# Patient Record
Sex: FEMALE | Race: WHITE | Employment: FULL TIME | URBAN - METROPOLITAN AREA
[De-identification: names, ages, dates, MRNs, and addresses within clinical notes are randomized per-mention and may not be internally consistent; named-entity substitution may affect disease eponyms.]

---

## 2020-08-11 LAB
ANTIBODY SCREEN, EXTERNAL: NEGATIVE
CHLAMYDIA, EXTERNAL: NEGATIVE
HBSAG, EXTERNAL: NEGATIVE
HIV, EXTERNAL: NON REACTIVE
N. GONORRHEA, EXTERNAL: NEGATIVE
RPR, EXTERNAL: NON REACTIVE
RUBELLA, EXTERNAL: NORMAL
TYPE, ABO & RH, EXTERNAL: NORMAL

## 2021-02-15 LAB — GRBS, EXTERNAL: NEGATIVE

## 2021-03-05 ENCOUNTER — HOSPITAL ENCOUNTER (OUTPATIENT)
Dept: PREADMISSION TESTING | Age: 36
Discharge: HOME | End: 2021-03-05

## 2021-03-05 LAB — SARS-COV-2, COV2: NORMAL

## 2021-03-05 PROCEDURE — U0003 INFECTIOUS AGENT DETECTION BY NUCLEIC ACID (DNA OR RNA); SEVERE ACUTE RESPIRATORY SYNDROME CORONAVIRUS 2 (SARS-COV-2) (CORONAVIRUS DISEASE [COVID-19]), AMPLIFIED PROBE TECHNIQUE, MAKING USE OF HIGH THROUGHPUT TECHNOLOGIES AS DESCRIBED BY CMS-2020-01-R: HCPCS

## 2021-03-06 LAB — SARS-COV-2, COV2NT: NOT DETECTED

## 2021-03-08 ENCOUNTER — HOSPITAL ENCOUNTER (INPATIENT)
Age: 36
LOS: 3 days | Discharge: HOME | End: 2021-03-11
Attending: OBSTETRICS & GYNECOLOGY | Admitting: OBSTETRICS & GYNECOLOGY
Payer: COMMERCIAL

## 2021-03-08 ENCOUNTER — ANESTHESIA EVENT (OUTPATIENT)
Dept: LABOR AND DELIVERY | Age: 36
End: 2021-03-08
Payer: COMMERCIAL

## 2021-03-08 ENCOUNTER — ANESTHESIA (OUTPATIENT)
Dept: LABOR AND DELIVERY | Age: 36
End: 2021-03-08
Payer: COMMERCIAL

## 2021-03-08 DIAGNOSIS — Z98.891 S/P C-SECTION: Primary | ICD-10-CM

## 2021-03-08 PROBLEM — Z34.90 TERM PREGNANCY: Status: ACTIVE | Noted: 2021-03-08

## 2021-03-08 LAB
ABO + RH BLD: NORMAL
AMPHET UR QL SCN: NEGATIVE
BARBITURATES UR QL SCN: NEGATIVE
BASOPHILS # BLD: 0 K/UL (ref 0–0.1)
BASOPHILS NFR BLD: 0 % (ref 0–1)
BENZODIAZ UR QL: NEGATIVE
BLOOD GROUP ANTIBODIES SERPL: NORMAL
CANNABINOIDS UR QL SCN: NEGATIVE
COCAINE UR QL SCN: NEGATIVE
DIFFERENTIAL METHOD BLD: ABNORMAL
DRUG SCRN COMMENT,DRGCM: NORMAL
EOSINOPHIL # BLD: 0 K/UL (ref 0–0.4)
EOSINOPHIL NFR BLD: 0 % (ref 0–7)
ERYTHROCYTE [DISTWIDTH] IN BLOOD BY AUTOMATED COUNT: 13.2 % (ref 11.5–14.5)
HCT VFR BLD AUTO: 38.5 % (ref 35–47)
HGB BLD-MCNC: 13 G/DL (ref 11.5–16)
IMM GRANULOCYTES # BLD AUTO: 0.1 K/UL (ref 0–0.04)
IMM GRANULOCYTES NFR BLD AUTO: 1 % (ref 0–0.5)
LYMPHOCYTES # BLD: 1.4 K/UL (ref 0.8–3.5)
LYMPHOCYTES NFR BLD: 12 % (ref 12–49)
MCH RBC QN AUTO: 30.9 PG (ref 26–34)
MCHC RBC AUTO-ENTMCNC: 33.8 G/DL (ref 30–36.5)
MCV RBC AUTO: 91.4 FL (ref 80–99)
METHADONE UR QL: NEGATIVE
MONOCYTES # BLD: 0.5 K/UL (ref 0–1)
MONOCYTES NFR BLD: 4 % (ref 5–13)
NEUTS SEG # BLD: 9.7 K/UL (ref 1.8–8)
NEUTS SEG NFR BLD: 83 % (ref 32–75)
NRBC # BLD: 0 K/UL (ref 0–0.01)
NRBC BLD-RTO: 0 PER 100 WBC
OPIATES UR QL: NEGATIVE
PCP UR QL: NEGATIVE
PLATELET # BLD AUTO: 188 K/UL (ref 150–400)
PMV BLD AUTO: 11.7 FL (ref 8.9–12.9)
RBC # BLD AUTO: 4.21 M/UL (ref 3.8–5.2)
SPECIMEN EXP DATE BLD: NORMAL
WBC # BLD AUTO: 11.7 K/UL (ref 3.6–11)

## 2021-03-08 PROCEDURE — 85025 COMPLETE CBC W/AUTO DIFF WBC: CPT

## 2021-03-08 PROCEDURE — 74011000250 HC RX REV CODE- 250: Performed by: NURSE ANESTHETIST, CERTIFIED REGISTERED

## 2021-03-08 PROCEDURE — 77030014125 HC TY EPDRL BBMI -B: Performed by: ANESTHESIOLOGY

## 2021-03-08 PROCEDURE — 36415 COLL VENOUS BLD VENIPUNCTURE: CPT

## 2021-03-08 PROCEDURE — 86901 BLOOD TYPING SEROLOGIC RH(D): CPT

## 2021-03-08 PROCEDURE — 80307 DRUG TEST PRSMV CHEM ANLYZR: CPT

## 2021-03-08 PROCEDURE — 74011250636 HC RX REV CODE- 250/636: Performed by: OBSTETRICS & GYNECOLOGY

## 2021-03-08 PROCEDURE — 74011000250 HC RX REV CODE- 250: Performed by: ANESTHESIOLOGY

## 2021-03-08 PROCEDURE — 65410000002 HC RM PRIVATE OB

## 2021-03-08 PROCEDURE — 75410000002 HC LABOR FEE PER 1 HR

## 2021-03-08 PROCEDURE — 10907ZC DRAINAGE OF AMNIOTIC FLUID, THERAPEUTIC FROM PRODUCTS OF CONCEPTION, VIA NATURAL OR ARTIFICIAL OPENING: ICD-10-PCS | Performed by: OBSTETRICS & GYNECOLOGY

## 2021-03-08 PROCEDURE — 4A1HXCZ MONITORING OF PRODUCTS OF CONCEPTION, CARDIAC RATE, EXTERNAL APPROACH: ICD-10-PCS | Performed by: OBSTETRICS & GYNECOLOGY

## 2021-03-08 PROCEDURE — 10H07YZ INSERTION OF OTHER DEVICE INTO PRODUCTS OF CONCEPTION, VIA NATURAL OR ARTIFICIAL OPENING: ICD-10-PCS | Performed by: OBSTETRICS & GYNECOLOGY

## 2021-03-08 RX ORDER — FENTANYL/BUPIVACAINE/NS/PF 2-1250MCG
1-16 PREFILLED PUMP RESERVOIR EPIDURAL CONTINUOUS
Status: DISCONTINUED | OUTPATIENT
Start: 2021-03-08 | End: 2021-03-09 | Stop reason: HOSPADM

## 2021-03-08 RX ORDER — SODIUM CHLORIDE, SODIUM LACTATE, POTASSIUM CHLORIDE, CALCIUM CHLORIDE 600; 310; 30; 20 MG/100ML; MG/100ML; MG/100ML; MG/100ML
125 INJECTION, SOLUTION INTRAVENOUS CONTINUOUS
Status: DISCONTINUED | OUTPATIENT
Start: 2021-03-08 | End: 2021-03-10

## 2021-03-08 RX ORDER — OXYTOCIN/RINGER'S LACTATE 30/500 ML
10 PLASTIC BAG, INJECTION (ML) INTRAVENOUS AS NEEDED
Status: DISCONTINUED | OUTPATIENT
Start: 2021-03-08 | End: 2021-03-11 | Stop reason: HOSPADM

## 2021-03-08 RX ORDER — VENLAFAXINE HYDROCHLORIDE 150 MG/1
150 CAPSULE, EXTENDED RELEASE ORAL DAILY
Status: DISCONTINUED | OUTPATIENT
Start: 2021-03-09 | End: 2021-03-09

## 2021-03-08 RX ORDER — SODIUM CHLORIDE 0.9 % (FLUSH) 0.9 %
5-40 SYRINGE (ML) INJECTION EVERY 8 HOURS
Status: DISCONTINUED | OUTPATIENT
Start: 2021-03-08 | End: 2021-03-10

## 2021-03-08 RX ORDER — SODIUM CHLORIDE 0.9 % (FLUSH) 0.9 %
5-40 SYRINGE (ML) INJECTION EVERY 8 HOURS
Status: DISCONTINUED | OUTPATIENT
Start: 2021-03-08 | End: 2021-03-09 | Stop reason: HOSPADM

## 2021-03-08 RX ORDER — VALACYCLOVIR HYDROCHLORIDE 500 MG/1
500 TABLET, FILM COATED ORAL 2 TIMES DAILY
COMMUNITY
End: 2021-03-11

## 2021-03-08 RX ORDER — NALOXONE HYDROCHLORIDE 0.4 MG/ML
0.4 INJECTION, SOLUTION INTRAMUSCULAR; INTRAVENOUS; SUBCUTANEOUS AS NEEDED
Status: DISCONTINUED | OUTPATIENT
Start: 2021-03-08 | End: 2021-03-09 | Stop reason: HOSPADM

## 2021-03-08 RX ORDER — FENTANYL/BUPIVACAINE/NS/PF 2-1250MCG
12 PREFILLED PUMP RESERVOIR EPIDURAL CONTINUOUS
Status: DISCONTINUED | OUTPATIENT
Start: 2021-03-08 | End: 2021-03-09 | Stop reason: HOSPADM

## 2021-03-08 RX ORDER — SODIUM CHLORIDE 0.9 % (FLUSH) 0.9 %
5-40 SYRINGE (ML) INJECTION AS NEEDED
Status: DISCONTINUED | OUTPATIENT
Start: 2021-03-08 | End: 2021-03-09 | Stop reason: HOSPADM

## 2021-03-08 RX ORDER — EPHEDRINE SULFATE/0.9% NACL/PF 50 MG/5 ML
12.5 SYRINGE (ML) INTRAVENOUS
Status: ACTIVE | OUTPATIENT
Start: 2021-03-08 | End: 2021-03-09

## 2021-03-08 RX ORDER — OXYTOCIN/RINGER'S LACTATE 30/500 ML
1-25 PLASTIC BAG, INJECTION (ML) INTRAVENOUS
Status: DISCONTINUED | OUTPATIENT
Start: 2021-03-08 | End: 2021-03-10

## 2021-03-08 RX ORDER — BUPIVACAINE HYDROCHLORIDE AND EPINEPHRINE 2.5; 5 MG/ML; UG/ML
INJECTION, SOLUTION EPIDURAL; INFILTRATION; INTRACAUDAL; PERINEURAL
Status: DISCONTINUED | OUTPATIENT
Start: 2021-03-08 | End: 2021-03-08

## 2021-03-08 RX ORDER — SODIUM CHLORIDE 0.9 % (FLUSH) 0.9 %
5-40 SYRINGE (ML) INJECTION AS NEEDED
Status: DISCONTINUED | OUTPATIENT
Start: 2021-03-08 | End: 2021-03-11 | Stop reason: HOSPADM

## 2021-03-08 RX ORDER — BUPIVACAINE HYDROCHLORIDE AND EPINEPHRINE 2.5; 5 MG/ML; UG/ML
INJECTION, SOLUTION EPIDURAL; INFILTRATION; INTRACAUDAL; PERINEURAL AS NEEDED
Status: DISCONTINUED | OUTPATIENT
Start: 2021-03-08 | End: 2021-03-09 | Stop reason: HOSPADM

## 2021-03-08 RX ORDER — VENLAFAXINE HYDROCHLORIDE 150 MG/1
150 TABLET, EXTENDED RELEASE ORAL DAILY
COMMUNITY

## 2021-03-08 RX ORDER — EPHEDRINE SULFATE/0.9% NACL/PF 50 MG/5 ML
10 SYRINGE (ML) INTRAVENOUS
Status: COMPLETED | OUTPATIENT
Start: 2021-03-08 | End: 2021-03-08

## 2021-03-08 RX ORDER — OXYTOCIN/RINGER'S LACTATE 30/500 ML
87.3 PLASTIC BAG, INJECTION (ML) INTRAVENOUS AS NEEDED
Status: COMPLETED | OUTPATIENT
Start: 2021-03-08 | End: 2021-03-09

## 2021-03-08 RX ADMIN — Medication 12 ML/HR: at 15:33

## 2021-03-08 RX ADMIN — Medication 12 ML/HR: at 22:52

## 2021-03-08 RX ADMIN — Medication 10 MG: at 15:47

## 2021-03-08 RX ADMIN — OXYTOCIN 1 MILLI-UNITS/MIN: 10 INJECTION, SOLUTION INTRAMUSCULAR; INTRAVENOUS at 22:46

## 2021-03-08 RX ADMIN — BUPIVACAINE HYDROCHLORIDE AND EPINEPHRINE 3 ML: 2.5; 5 INJECTION, SOLUTION EPIDURAL; INFILTRATION; INTRACAUDAL; PERINEURAL at 15:20

## 2021-03-08 RX ADMIN — OXYTOCIN 4 MILLI-UNITS/MIN: 10 INJECTION, SOLUTION INTRAMUSCULAR; INTRAVENOUS at 23:50

## 2021-03-08 RX ADMIN — BUPIVACAINE HYDROCHLORIDE AND EPINEPHRINE 7 ML: 2.5; 5 INJECTION, SOLUTION EPIDURAL; INFILTRATION; INTRACAUDAL; PERINEURAL at 15:22

## 2021-03-08 RX ADMIN — SODIUM CHLORIDE, POTASSIUM CHLORIDE, SODIUM LACTATE AND CALCIUM CHLORIDE 125 ML/HR: 600; 310; 30; 20 INJECTION, SOLUTION INTRAVENOUS at 20:04

## 2021-03-08 RX ADMIN — SODIUM CHLORIDE, POTASSIUM CHLORIDE, SODIUM LACTATE AND CALCIUM CHLORIDE 999 ML/HR: 600; 310; 30; 20 INJECTION, SOLUTION INTRAVENOUS at 13:58

## 2021-03-08 RX ADMIN — SODIUM CHLORIDE, POTASSIUM CHLORIDE, SODIUM LACTATE AND CALCIUM CHLORIDE 999 ML/HR: 600; 310; 30; 20 INJECTION, SOLUTION INTRAVENOUS at 15:03

## 2021-03-08 NOTE — PROGRESS NOTES
Pt of Dr. Kalen Tolbert arrives to L&D sent from office with report of labor \"3cm in office. \" Pt deneis vaginal bleeding, discharge, LOF; reports +FM. Pt denies complications with pregnancy.

## 2021-03-08 NOTE — ROUTINE PROCESS
1510- Anesthesia at bedside to place epidural. Patient sitting up on side of bed. Patient off EFM at this time 1512- Time out performed 1520- Test dose in by anesthesia 1523- Patient back on EFM. 200- Dr. Salas King at bedside. SVE done. Cervix 6 cm 
 
1910- Bedside and Verbal shift change report given to April CAMARA and Joanie CAREY (oncoming nurse) by Danny Miller (offgoing nurse). Report included the following information SBAR, Kardex, MAR and Recent Results.

## 2021-03-08 NOTE — PROGRESS NOTES
03/08/21 1546   Cervical Exam   Dilation (cm) 4   Eff 80 %   Station -2   Baby Position Vertex   G1 38 6/7 wks in labor now comfortable with epidural. AROM performed- scant clear fluid  -CEFM  -gbs neg

## 2021-03-08 NOTE — ANESTHESIA PROCEDURE NOTES
Epidural Block    Start time: 3/8/2021 3:12 PM  End time: 3/8/2021 3:22 PM  Reason for block: labor epidural  Staffing  Performed: CRNA   Resident/CRNA: Yvette Kingsley CRNA  Preanesthetic Checklist  Completed: patient identified, IV checked, risks and benefits discussed, surgical consent, monitors and equipment checked, pre-op evaluation and timeout performed  Block Placement  Patient position: sitting  Prep: DuraPrep  Sterility prep: cap, drape, gloves and mask  Sedation level: no sedation  Patient monitoring: continuous pulse oximetry, frequent blood pressure checks and heart rate  Approach: midline  Location: lumbar  Epidural  Loss of resistance technique: saline  Guidance: landmark technique  Needle  Needle type: Tuohy   Needle gauge: 17 G  Needle length: 10 cm  Needle insertion depth: 6 cm  Catheter type: end hole  Catheter size: 19 G  Catheter at skin depth: 12 cm  Catheter securement method: clear occlusive dressing  Test dose: negative  Assessment  Sensory level: T10  Block outcome: pain improved  Number of attempts: 1  Procedure assessment: patient tolerated procedure well with no complications

## 2021-03-08 NOTE — H&P
History & Physical    Name: Chana Spear MRN: [id number]  SSN: [id number]    YOB: 1985  Age: 28 y.o. Sex: female        Subjective:     Estimated Date of Delivery: 3/16/21  OB History    Para Term  AB Living   1         0   SAB TAB Ectopic Molar Multiple Live Births                    # Outcome Date GA Lbr Marcus/2nd Weight Sex Delivery Anes PTL Lv   1 Current                Ms. Vickey Garcia is admitted with pregnancy at 38w6d for active labor. Prenatal course was normal. Please see prenatal records for details. She presented to office today c/o contractions since 10 am. No LOF/VB and reports active FM. Found to be 3/100/-2/vtx on cervical exam by Dr Genevieve Devine  H/o HSV on Valtrex with no evidence of active outbreak  H/o depression on Effexor      Past Medical History:   Diagnosis Date    Genital herpes     Psychiatric problem     depression     Past Surgical History:   Procedure Laterality Date    HX OTHER SURGICAL      eye surgery    HX WISDOM TEETH EXTRACTION       Social History     Occupational History    Not on file   Tobacco Use    Smoking status: Never Smoker    Smokeless tobacco: Never Used   Substance and Sexual Activity    Alcohol use: Not Currently    Drug use: Never    Sexual activity: Not on file     History reviewed. No pertinent family history. Allergies   Allergen Reactions    Penicillins Unknown (comments)     Pt reports childhood,unknown     Prior to Admission medications    Not on File        Review of Systems: A comprehensive review of systems was negative except for that written in the HPI. Objective:     Vitals:  Vitals:    21 1416   Weight: 78 kg (172 lb)   Height: 5' 4\" (1.626 m)        Physical Exam:  Patient without distress.   Heart: Regular rate and rhythm  Lung: clear to auscultation throughout lung fields, no wheezes, no rales, no rhonchi and normal respiratory effort  Abdomen: soft, nontender  Membranes:  Intact  Fetal Heart Rate: Reactive    Prenatal Labs:   No results found for: ABORH, RUBELLAEXT, GRBSEXT, HBSAGEXT, HIVEXT, RPREXT, GONNOEXT, CHLAMEXT, ABORHEXT, RUBELLAEXT, GRBSEXT, HBSAGEXT, HIVEXT, RPREXT, GONNOEXT, CHLAMEXT      Assessment/Plan:     Active Problems:    Term pregnancy (3/8/2021)         Plan: Admit for Labor  Progressing normally. Group B Strep was negative  -cbc, STBB  -desires epidural  -CEFM.

## 2021-03-09 PROCEDURE — 74011250637 HC RX REV CODE- 250/637: Performed by: OBSTETRICS & GYNECOLOGY

## 2021-03-09 PROCEDURE — 76060000078 HC EPIDURAL ANESTHESIA: Performed by: STUDENT IN AN ORGANIZED HEALTH CARE EDUCATION/TRAINING PROGRAM

## 2021-03-09 PROCEDURE — 2709999900 HC NON-CHARGEABLE SUPPLY

## 2021-03-09 PROCEDURE — 77030002974 HC SUT PLN J&J -A

## 2021-03-09 PROCEDURE — 65410000002 HC RM PRIVATE OB

## 2021-03-09 PROCEDURE — 74011250636 HC RX REV CODE- 250/636: Performed by: STUDENT IN AN ORGANIZED HEALTH CARE EDUCATION/TRAINING PROGRAM

## 2021-03-09 PROCEDURE — 76060000032 HC ANESTHESIA 0.5 TO 1 HR: Performed by: STUDENT IN AN ORGANIZED HEALTH CARE EDUCATION/TRAINING PROGRAM

## 2021-03-09 PROCEDURE — 74011000250 HC RX REV CODE- 250: Performed by: ANESTHESIOLOGY

## 2021-03-09 PROCEDURE — 76010000391 HC C SECN FIRST 1 HR: Performed by: STUDENT IN AN ORGANIZED HEALTH CARE EDUCATION/TRAINING PROGRAM

## 2021-03-09 PROCEDURE — 74011250636 HC RX REV CODE- 250/636: Performed by: NURSE ANESTHETIST, CERTIFIED REGISTERED

## 2021-03-09 PROCEDURE — 77010026065 HC OXYGEN MINIMUM MEDICAL AIR

## 2021-03-09 PROCEDURE — 75410000003 HC RECOV DEL/VAG/CSECN EA 0.5 HR

## 2021-03-09 PROCEDURE — 77030018842 HC SOL IRR SOD CL 9% BAXT -A

## 2021-03-09 PROCEDURE — 74011000258 HC RX REV CODE- 258: Performed by: STUDENT IN AN ORGANIZED HEALTH CARE EDUCATION/TRAINING PROGRAM

## 2021-03-09 PROCEDURE — 77030010507 HC ADH SKN DERMBND J&J -B

## 2021-03-09 PROCEDURE — 77030002933 HC SUT MCRYL J&J -A

## 2021-03-09 PROCEDURE — 77030008459 HC STPLR SKN COOP -B

## 2021-03-09 PROCEDURE — 74011000250 HC RX REV CODE- 250: Performed by: NURSE ANESTHETIST, CERTIFIED REGISTERED

## 2021-03-09 PROCEDURE — 77030031139 HC SUT VCRL2 J&J -A

## 2021-03-09 PROCEDURE — 75410000002 HC LABOR FEE PER 1 HR

## 2021-03-09 PROCEDURE — 74011250636 HC RX REV CODE- 250/636: Performed by: OBSTETRICS & GYNECOLOGY

## 2021-03-09 PROCEDURE — 76060000078 HC EPIDURAL ANESTHESIA

## 2021-03-09 PROCEDURE — 77030018809 HC RETRCTR ALXSO DISP AMR -B

## 2021-03-09 RX ORDER — CLINDAMYCIN PHOSPHATE 900 MG/50ML
900 INJECTION INTRAVENOUS EVERY 8 HOURS
Status: DISCONTINUED | OUTPATIENT
Start: 2021-03-09 | End: 2021-03-10

## 2021-03-09 RX ORDER — LIDOCAINE HYDROCHLORIDE AND EPINEPHRINE 20; 5 MG/ML; UG/ML
INJECTION, SOLUTION EPIDURAL; INFILTRATION; INTRACAUDAL; PERINEURAL
Status: COMPLETED
Start: 2021-03-09 | End: 2021-03-09

## 2021-03-09 RX ORDER — MORPHINE SULFATE 0.5 MG/ML
INJECTION, SOLUTION EPIDURAL; INTRATHECAL; INTRAVENOUS AS NEEDED
Status: DISCONTINUED | OUTPATIENT
Start: 2021-03-09 | End: 2021-03-09 | Stop reason: HOSPADM

## 2021-03-09 RX ORDER — ONDANSETRON 2 MG/ML
INJECTION INTRAMUSCULAR; INTRAVENOUS AS NEEDED
Status: DISCONTINUED | OUTPATIENT
Start: 2021-03-09 | End: 2021-03-09 | Stop reason: HOSPADM

## 2021-03-09 RX ORDER — CLINDAMYCIN PHOSPHATE 900 MG/50ML
900 INJECTION INTRAVENOUS ONCE
Status: COMPLETED | OUTPATIENT
Start: 2021-03-09 | End: 2021-03-09

## 2021-03-09 RX ORDER — VENLAFAXINE HYDROCHLORIDE 150 MG/1
150 CAPSULE, EXTENDED RELEASE ORAL
Status: DISCONTINUED | OUTPATIENT
Start: 2021-03-10 | End: 2021-03-11 | Stop reason: HOSPADM

## 2021-03-09 RX ORDER — PHENYLEPHRINE HCL IN 0.9% NACL 0.4MG/10ML
SYRINGE (ML) INTRAVENOUS AS NEEDED
Status: DISCONTINUED | OUTPATIENT
Start: 2021-03-09 | End: 2021-03-09 | Stop reason: HOSPADM

## 2021-03-09 RX ORDER — CLINDAMYCIN PHOSPHATE 900 MG/50ML
INJECTION INTRAVENOUS
Status: DISPENSED
Start: 2021-03-09 | End: 2021-03-10

## 2021-03-09 RX ORDER — LIDOCAINE HYDROCHLORIDE AND EPINEPHRINE 20; 5 MG/ML; UG/ML
INJECTION, SOLUTION EPIDURAL; INFILTRATION; INTRACAUDAL; PERINEURAL AS NEEDED
Status: DISCONTINUED | OUTPATIENT
Start: 2021-03-09 | End: 2021-03-09 | Stop reason: HOSPADM

## 2021-03-09 RX ORDER — OXYTOCIN/RINGER'S LACTATE 30/500 ML
PLASTIC BAG, INJECTION (ML) INTRAVENOUS
Status: DISPENSED
Start: 2021-03-09 | End: 2021-03-10

## 2021-03-09 RX ORDER — SODIUM CHLORIDE, SODIUM LACTATE, POTASSIUM CHLORIDE, CALCIUM CHLORIDE 600; 310; 30; 20 MG/100ML; MG/100ML; MG/100ML; MG/100ML
INJECTION, SOLUTION INTRAVENOUS
Status: DISCONTINUED | OUTPATIENT
Start: 2021-03-09 | End: 2021-03-09 | Stop reason: HOSPADM

## 2021-03-09 RX ORDER — OXYTOCIN 10 [USP'U]/ML
INJECTION, SOLUTION INTRAMUSCULAR; INTRAVENOUS AS NEEDED
Status: DISCONTINUED | OUTPATIENT
Start: 2021-03-09 | End: 2021-03-09 | Stop reason: HOSPADM

## 2021-03-09 RX ADMIN — Medication 12 ML/HR: at 06:26

## 2021-03-09 RX ADMIN — CLINDAMYCIN IN 5 PERCENT DEXTROSE 900 MG: 18 INJECTION, SOLUTION INTRAVENOUS at 22:51

## 2021-03-09 RX ADMIN — VENLAFAXINE HYDROCHLORIDE 150 MG: 150 CAPSULE, EXTENDED RELEASE ORAL at 23:00

## 2021-03-09 RX ADMIN — SODIUM CHLORIDE, POTASSIUM CHLORIDE, SODIUM LACTATE AND CALCIUM CHLORIDE 125 ML/HR: 600; 310; 30; 20 INJECTION, SOLUTION INTRAVENOUS at 23:37

## 2021-03-09 RX ADMIN — CLINDAMYCIN IN 5 PERCENT DEXTROSE 900 MG: 18 INJECTION, SOLUTION INTRAVENOUS at 15:05

## 2021-03-09 RX ADMIN — Medication 80 MCG: at 15:59

## 2021-03-09 RX ADMIN — SODIUM CHLORIDE, POTASSIUM CHLORIDE, SODIUM LACTATE AND CALCIUM CHLORIDE 125 ML/HR: 600; 310; 30; 20 INJECTION, SOLUTION INTRAVENOUS at 03:15

## 2021-03-09 RX ADMIN — SODIUM CHLORIDE, POTASSIUM CHLORIDE, SODIUM LACTATE AND CALCIUM CHLORIDE 125 ML/HR: 600; 310; 30; 20 INJECTION, SOLUTION INTRAVENOUS at 16:36

## 2021-03-09 RX ADMIN — GENTAMICIN SULFATE 320 MG: 40 INJECTION, SOLUTION INTRAMUSCULAR; INTRAVENOUS at 15:25

## 2021-03-09 RX ADMIN — SODIUM CHLORIDE, POTASSIUM CHLORIDE, SODIUM LACTATE AND CALCIUM CHLORIDE: 600; 310; 30; 20 INJECTION, SOLUTION INTRAVENOUS at 15:16

## 2021-03-09 RX ADMIN — OXYTOCIN 20 UNITS: 10 INJECTION, SOLUTION INTRAMUSCULAR; INTRAVENOUS at 15:32

## 2021-03-09 RX ADMIN — ONDANSETRON HYDROCHLORIDE 4 MG: 2 INJECTION, SOLUTION INTRAMUSCULAR; INTRAVENOUS at 15:59

## 2021-03-09 RX ADMIN — SODIUM CHLORIDE, POTASSIUM CHLORIDE, SODIUM LACTATE AND CALCIUM CHLORIDE: 600; 310; 30; 20 INJECTION, SOLUTION INTRAVENOUS at 16:02

## 2021-03-09 RX ADMIN — MORPHINE SULFATE 5 MG: 0.5 INJECTION, SOLUTION EPIDURAL; INTRATHECAL; INTRAVENOUS at 16:04

## 2021-03-09 RX ADMIN — LIDOCAINE HYDROCHLORIDE,EPINEPHRINE BITARTRATE 5 ML: 20; .005 INJECTION, SOLUTION EPIDURAL; INFILTRATION; INTRACAUDAL; PERINEURAL at 15:21

## 2021-03-09 RX ADMIN — LIDOCAINE HYDROCHLORIDE,EPINEPHRINE BITARTRATE 10 ML: 20; .005 INJECTION, SOLUTION EPIDURAL; INFILTRATION; INTRACAUDAL; PERINEURAL at 15:16

## 2021-03-09 RX ADMIN — OXYTOCIN 87.3 MILLI-UNITS/MIN: 10 INJECTION, SOLUTION INTRAMUSCULAR; INTRAVENOUS at 16:34

## 2021-03-09 NOTE — PROGRESS NOTES
Labor Progress Note  Patient seen, fetal heart rate and contraction pattern evaluated.      Physical Exam:  Visit Vitals  /74   Pulse 72   Temp 98.2 °F (36.8 °C)   Resp 16   Ht 5' 4\" (1.626 m)   Wt 78 kg (172 lb)   SpO2 98%   Breastfeeding Yes   BMI 29.52 kg/m²     Cervical Exam: 10/100/+2  Membranes:  Ruptured; meconium  Uterine Activity: Frequency: 2-3 minutes  Fetal Heart Rate: 180s,  adequate variability and reactivity    Assessment/Plan:  S/p 3 hours of pushing with no descent, fetus felt to be OP  Fetal tachycardia, no maternal temp or maternal tachycardia  Will proceed with 1CS for SSA  Discussed with patient and partner    Franklin Oshea MD

## 2021-03-09 NOTE — L&D DELIVERY NOTE
Delivery Summary  Patient: Paul Calix             Circumcision:   NA-female  Additional Delivery Comments - See op note    Fetal tachycardia prior to delivery with maternal temp to TMax 100.1*. Will continue abx for 24hrs postpartum in setting of concern for Triple I. Information for the patient's :  Rebecca Engle [[id number]]       Labor Events:    Labor: No    Steroids: None   Cervical Ripening Date/Time:       Cervical Ripening Type: None   Antibiotics During Labor: No   Rupture Identifier:      Rupture Date/Time: 3/8/2021 3:44 PM   Rupture Type: AROM   Amniotic Fluid Volume: Moderate    Amniotic Fluid Description: Clear;Meconium    Amniotic Fluid Odor: None    Induction: None       Induction Date/Time:        Indications for Induction:      Augmentation: AROM; Oxytocin   Augmentation Date/Time: 91593:55 PM   Indications for Augmentation: Ineffective Contraction Pattern   Labor complications: Additional complications:        Delivery Events:  Indications For Episiotomy:     Episiotomy:     Perineal Laceration(s):     Repaired:     Periurethral Laceration Location:      Repaired:     Labial Laceration Location:     Repaired:     Sulcal Laceration Location:     Repaired:     Vaginal Laceration Location:     Repaired:     Cervical Laceration Location:     Repaired:     Repair Suture:     Number of Repair Packets:     Estimated Blood Loss (ml):  ml   Quantitative Blood Loss (ml)                Delivery Date: 3/9/2021    Delivery Time: 3:31 PM  Delivery Type:    Sex:  Female    Gestational Age: 39w0d   Delivery Clinician:     Living Status:     Delivery Location:              APGARS  One minute Five minutes Ten minutes   Skin color:            Heart rate:            Grimace:            Muscle tone:            Breathing:             Totals:                Presentation:      Position:        Resuscitation Method:        Meconium Stained:        Cord Information: Complications:    Cord around:    Delayed cord clamping? Cord clamped date/time:   Disposition of Cord Blood:      Blood Gases Sent?:      Placenta:  Date/Time:    Removal:        Appearance:        Measurements:  Birth Weight: 3.52 kg      Birth Length: 52.1 cm      Head Circumference: 31.2 cm      Chest Circumference: 33.5 cm     Abdominal Girth: 32.5 cm    Other Providers:    , Obstetrician;Primary Nurse;Primary  Nurse;Nicu Nurse;Neonatologist;Anesthesiologist;Crna;Nurse Practitioner;Midwife;Nursery Nurse           Cord pH:  none    Episiotomy:    Laceration(s):      Estimated Blood Loss (ml):     Labor Events  Method: None      Augmentation: AROM; Oxytocin   Cervical Ripening:     None        Hospital Problems  Never Reviewed          Codes Class Noted POA    Term pregnancy ICD-10-CM: Z34.90  ICD-9-CM: V22.1  3/8/2021 Unknown              Operative Vaginal Delivery - none    Group B Strep:   Lab Results   Component Value Date/Time    GrBStrep, External negative 02/15/2021     Information for the patient's :  Crystal Ackerman [[id number]]   No results found for: ABORH, PCTABR, PCTDIG, BILI, ABORHEXT, ABORH     No results found for: APH, APCO2, APO2, AHCO3, ABEC, ABDC, O2ST, EPHV, PCO2V, PO2V, HCO3V, EBEV, EBDV, SITE, Elizabeth Vidal MD

## 2021-03-09 NOTE — OP NOTES
Operative Note    Name: Hieu Rouse Record Number: [id number]   YOB: 1985  Today's Date: 2021      Pre-operative Diagnosis: , 2nd stage arrest    Post-operative Diagnosis: same    Operation: Low Transverse:   SECTION    Surgeon(s):  Poncho Mcmanus MD    Anesthesia: CSE    Prophylactic Antibiotics: Bretta Naas  DVT Prophylaxis: Sequential Compression Devices      Fetal Description: eaton     Birth Information:   Information for the patient's :  Syl Morel [[id number]]   Delivery of a 3.52 kg female infant on 3/9/2021 at 3:31 PM. Apgars were   and  . Umbilical Cord:       Umbilical Cord Events:       Placenta:   removal with   appearance. Amniotic Fluid Volume: Moderate     Amniotic Fluid Description:  Clear;Meconium    EBL: 1L    Umbilical Cord: Nuchal Cord x  1    Placenta:  manual removal    Specimens: None           Complications:  none    Procedure Detail:      After proper patient identification and consent, the patient was taken to the operating room, where epidural anesthesia was dosed and found to be adequate. Tam catheter had been placed using sterile technique. The patient was prepped and draped in the normal sterile fashion. The abdomen was entered using the Pfannenstiel technique. The peritoneum was entered bluntlly well superior to the bladder without any apparent injury. A low transverse uterine incision was made with the scalpel and extended with blunt finger dissection. Amniotomy was performed and the fluid was medium amount with thin meconium. The babys head was then delivered atraumatically in straight OP position. The nose and mouth were suctioned. The cord was clamped and cut and the baby was handed off to Nursing staff in attendance and  staff in attendance. Placenta was then removed from the uterus. The uterus was curettaged with a moist lap pad and cleared of all clots and debris. Significant bleeding was noted at the right aspect of the incision and an O'Criders stitch was placed with improved hemostasis. The uterine incision was then closed with 0 vicryl, double layer  in running locking fashion with good hemostasis assured. The posterior cul-de-sac and paracolic gutters were irrigated with warm normal saline. Lizbet was placed at the apices of the incision and good hemostasis was again reassured. The fascia was closed with 0 Vicryl in a running fashion. Good hemostasis was assured. The subcutaneous space was irrigated copiously and made hemostatic with Bovie cautery. The skin was closed with a subcuticular staple closure using the Insorb device. The patient tolerated the procedure well. Sponge, lap, and needle counts were correct times three and the patient and baby were taken to recovery/postpartum room in stable condition.     Molina Zaragoza MD  March 9, 2021  4:22 PM

## 2021-03-09 NOTE — PROGRESS NOTES
Labor Progress Note  Patient seen, fetal heart rate and contraction pattern evaluated. Patient reports she is comfortable with epidural in place. Physical Exam:  Visit Vitals  /69   Pulse 90   Temp 98.1 °F (36.7 °C)   Resp 16   Ht 5' 4\" (1.626 m)   Wt 78 kg (172 lb)   SpO2 99%   Breastfeeding Yes   BMI 29.52 kg/m²     Cervical Exam: 7/90/-1  Membranes:  ruptutred  Uterine Activity: Frequency: 4-5 minutes  Fetal Heart Rate: 150,  adequate variability and reactivity  Accelerations: yes  Decelerations: none    Assessment/Plan:  Reassuring fetal status. Inadequate progress  IUPC placed; begin augmentation if indicated.     Patsy Marion MD

## 2021-03-09 NOTE — PROGRESS NOTES
03/08/21 1905   Cervical Exam   Dilation (cm) 6   Eff 90 %   Station 0   Baby Position Vertex   G1 38 6/7 in labor progressing well  -CEFM

## 2021-03-09 NOTE — PROGRESS NOTES
Bedside and Verbal shift change report given to PHILIP Gilliland  (oncoming nurse) by Daina Gibson. Destiny Lynne and PAULO Duarte (offgoing nurse). Report included the following information SBAR, Kardex, Intake/Output and MAR.

## 2021-03-09 NOTE — PROGRESS NOTES
1910:  Bedside and Verbal shift change report given to PAULO Metzger and TANNER Kelley (oncoming nurse) by Meka Carter and ROBERTO Ramirez (offgoing nurse). Report included the following information SBAR, Kardex, Intake/Output and MAR.

## 2021-03-09 NOTE — PROGRESS NOTES
Labor Progress Note  Patient seen, fetal heart rate and contraction pattern evaluated. Patient states she remains comfortable with epidural.    Physical Exam:  Visit Vitals  /65   Pulse 80   Temp 98.3 °F (36.8 °C)   Resp 16   Ht 5' 4\" (1.626 m)   Wt 78 kg (172 lb)   SpO2 98%   Breastfeeding Yes   BMI 29.52 kg/m²     Cervical Exam: 8/90/+1  Membranes:  Ruptured; meconium  Uterine Activity: Frequency: 2-3 minutes  Fetal Heart Rate: 140s,  adequate variability and reactivity  Accelerations: yes  Decelerations: none  Pitocin at 12 miu/min    Assessment/Plan:  Reassuring fetal status. Progressing slowly; contraction pattern not yet fully adequate  Continue pitocin    CITLALI Lopez MD

## 2021-03-09 NOTE — PROGRESS NOTES
TRANSFER - IN REPORT:    Verbal report received from PHILIP Sauceda RN(name) on Mackenzie Musa  being received from L&D(unit) for routine progression of care      Report consisted of patients Situation, Background, Assessment and   Recommendations(SBAR). Information from the following report(s) SBAR, Kardex, OR Summary, Procedure Summary, Intake/Output, MAR and Recent Results was reviewed with the receiving nurse. Opportunity for questions and clarification was provided. Assessment completed upon patients arrival to unit and care assumed.

## 2021-03-09 NOTE — ANESTHESIA POSTPROCEDURE EVALUATION
Procedure(s):   SECTION.     epidural    Anesthesia Post Evaluation      Multimodal analgesia: multimodal analgesia used between 6 hours prior to anesthesia start to PACU discharge  Patient location during evaluation: bedside  Patient participation: complete - patient participated  Level of consciousness: awake  Pain management: adequate  Airway patency: patent  Anesthetic complications: no  Cardiovascular status: acceptable  Respiratory status: acceptable  Hydration status: acceptable  Post anesthesia nausea and vomiting:  controlled  Final Post Anesthesia Temperature Assessment:  Normothermia (36.0-37.5 degrees C)      INITIAL Post-op Vital signs:   Vitals Value Taken Time   /58 21 1613   Temp 36.7 °C (98 °F) 21 1613   Pulse 99 21 1613   Resp 16 21 1613   SpO2 99 % 21 1613

## 2021-03-10 LAB
ALBUMIN SERPL-MCNC: 1.9 G/DL (ref 3.5–5)
ALBUMIN/GLOB SERPL: 0.5 {RATIO} (ref 1.1–2.2)
ALP SERPL-CCNC: 174 U/L (ref 45–117)
ALT SERPL-CCNC: 27 U/L (ref 12–78)
ANION GAP SERPL CALC-SCNC: 5 MMOL/L (ref 5–15)
AST SERPL-CCNC: 48 U/L (ref 15–37)
BILIRUB SERPL-MCNC: 0.5 MG/DL (ref 0.2–1)
BUN SERPL-MCNC: 12 MG/DL (ref 6–20)
BUN/CREAT SERPL: 12 (ref 12–20)
CALCIUM SERPL-MCNC: 8.5 MG/DL (ref 8.5–10.1)
CHLORIDE SERPL-SCNC: 109 MMOL/L (ref 97–108)
CO2 SERPL-SCNC: 25 MMOL/L (ref 21–32)
CREAT SERPL-MCNC: 0.97 MG/DL (ref 0.55–1.02)
ERYTHROCYTE [DISTWIDTH] IN BLOOD BY AUTOMATED COUNT: 13.7 % (ref 11.5–14.5)
GLOBULIN SER CALC-MCNC: 3.8 G/DL (ref 2–4)
GLUCOSE SERPL-MCNC: 70 MG/DL (ref 65–100)
HCT VFR BLD AUTO: 34.1 % (ref 35–47)
HGB BLD-MCNC: 11.3 G/DL (ref 11.5–16)
MCH RBC QN AUTO: 31.1 PG (ref 26–34)
MCHC RBC AUTO-ENTMCNC: 33.1 G/DL (ref 30–36.5)
MCV RBC AUTO: 93.9 FL (ref 80–99)
NRBC # BLD: 0 K/UL (ref 0–0.01)
NRBC BLD-RTO: 0 PER 100 WBC
PLATELET # BLD AUTO: 174 K/UL (ref 150–400)
PMV BLD AUTO: 11.7 FL (ref 8.9–12.9)
POTASSIUM SERPL-SCNC: 4.1 MMOL/L (ref 3.5–5.1)
PROT SERPL-MCNC: 5.7 G/DL (ref 6.4–8.2)
RBC # BLD AUTO: 3.63 M/UL (ref 3.8–5.2)
SODIUM SERPL-SCNC: 139 MMOL/L (ref 136–145)
WBC # BLD AUTO: 17 K/UL (ref 3.6–11)

## 2021-03-10 PROCEDURE — 85027 COMPLETE CBC AUTOMATED: CPT

## 2021-03-10 PROCEDURE — 80053 COMPREHEN METABOLIC PANEL: CPT

## 2021-03-10 PROCEDURE — 36415 COLL VENOUS BLD VENIPUNCTURE: CPT

## 2021-03-10 PROCEDURE — 74011250637 HC RX REV CODE- 250/637: Performed by: OBSTETRICS & GYNECOLOGY

## 2021-03-10 PROCEDURE — 74011250636 HC RX REV CODE- 250/636: Performed by: STUDENT IN AN ORGANIZED HEALTH CARE EDUCATION/TRAINING PROGRAM

## 2021-03-10 PROCEDURE — 74011250637 HC RX REV CODE- 250/637: Performed by: STUDENT IN AN ORGANIZED HEALTH CARE EDUCATION/TRAINING PROGRAM

## 2021-03-10 PROCEDURE — 65410000002 HC RM PRIVATE OB

## 2021-03-10 RX ORDER — ACETAMINOPHEN 325 MG/1
650 TABLET ORAL
Status: DISCONTINUED | OUTPATIENT
Start: 2021-03-10 | End: 2021-03-11 | Stop reason: HOSPADM

## 2021-03-10 RX ORDER — OXYCODONE HYDROCHLORIDE 5 MG/1
5 TABLET ORAL
Status: DISCONTINUED | OUTPATIENT
Start: 2021-03-10 | End: 2021-03-11 | Stop reason: HOSPADM

## 2021-03-10 RX ORDER — ONDANSETRON 2 MG/ML
4 INJECTION INTRAMUSCULAR; INTRAVENOUS
Status: DISCONTINUED | OUTPATIENT
Start: 2021-03-10 | End: 2021-03-11 | Stop reason: HOSPADM

## 2021-03-10 RX ORDER — OXYTOCIN/RINGER'S LACTATE 30/500 ML
87.3 PLASTIC BAG, INJECTION (ML) INTRAVENOUS AS NEEDED
Status: DISCONTINUED | OUTPATIENT
Start: 2021-03-10 | End: 2021-03-11 | Stop reason: HOSPADM

## 2021-03-10 RX ORDER — SIMETHICONE 80 MG
80 TABLET,CHEWABLE ORAL AS NEEDED
Status: DISCONTINUED | OUTPATIENT
Start: 2021-03-10 | End: 2021-03-11 | Stop reason: HOSPADM

## 2021-03-10 RX ORDER — NALOXONE HYDROCHLORIDE 0.4 MG/ML
0.4 INJECTION, SOLUTION INTRAMUSCULAR; INTRAVENOUS; SUBCUTANEOUS AS NEEDED
Status: DISCONTINUED | OUTPATIENT
Start: 2021-03-10 | End: 2021-03-11 | Stop reason: HOSPADM

## 2021-03-10 RX ORDER — OXYTOCIN/RINGER'S LACTATE 30/500 ML
10 PLASTIC BAG, INJECTION (ML) INTRAVENOUS AS NEEDED
Status: DISCONTINUED | OUTPATIENT
Start: 2021-03-10 | End: 2021-03-11 | Stop reason: HOSPADM

## 2021-03-10 RX ORDER — VALACYCLOVIR HYDROCHLORIDE 500 MG/1
500 TABLET, FILM COATED ORAL DAILY
Status: DISCONTINUED | OUTPATIENT
Start: 2021-03-10 | End: 2021-03-11 | Stop reason: HOSPADM

## 2021-03-10 RX ORDER — ACETAMINOPHEN 325 MG/1
TABLET ORAL
Status: COMPLETED
Start: 2021-03-10 | End: 2021-03-11

## 2021-03-10 RX ORDER — IBUPROFEN 400 MG/1
800 TABLET ORAL EVERY 8 HOURS
Status: DISCONTINUED | OUTPATIENT
Start: 2021-03-10 | End: 2021-03-11 | Stop reason: HOSPADM

## 2021-03-10 RX ORDER — SODIUM CHLORIDE 0.9 % (FLUSH) 0.9 %
5-40 SYRINGE (ML) INJECTION AS NEEDED
Status: DISCONTINUED | OUTPATIENT
Start: 2021-03-10 | End: 2021-03-11 | Stop reason: HOSPADM

## 2021-03-10 RX ORDER — ZOLPIDEM TARTRATE 5 MG/1
5 TABLET ORAL
Status: DISCONTINUED | OUTPATIENT
Start: 2021-03-10 | End: 2021-03-11 | Stop reason: HOSPADM

## 2021-03-10 RX ORDER — SODIUM CHLORIDE, SODIUM LACTATE, POTASSIUM CHLORIDE, CALCIUM CHLORIDE 600; 310; 30; 20 MG/100ML; MG/100ML; MG/100ML; MG/100ML
125 INJECTION, SOLUTION INTRAVENOUS CONTINUOUS
Status: DISCONTINUED | OUTPATIENT
Start: 2021-03-10 | End: 2021-03-10

## 2021-03-10 RX ORDER — HYDROMORPHONE HYDROCHLORIDE 2 MG/ML
1 INJECTION, SOLUTION INTRAMUSCULAR; INTRAVENOUS; SUBCUTANEOUS
Status: DISCONTINUED | OUTPATIENT
Start: 2021-03-10 | End: 2021-03-11 | Stop reason: HOSPADM

## 2021-03-10 RX ORDER — SODIUM CHLORIDE 0.9 % (FLUSH) 0.9 %
5-40 SYRINGE (ML) INJECTION EVERY 8 HOURS
Status: DISCONTINUED | OUTPATIENT
Start: 2021-03-10 | End: 2021-03-11 | Stop reason: HOSPADM

## 2021-03-10 RX ADMIN — IBUPROFEN 800 MG: 400 TABLET, FILM COATED ORAL at 20:17

## 2021-03-10 RX ADMIN — IBUPROFEN 800 MG: 400 TABLET, FILM COATED ORAL at 12:51

## 2021-03-10 RX ADMIN — Medication 10 ML: at 16:15

## 2021-03-10 RX ADMIN — OXYCODONE 5 MG: 5 TABLET ORAL at 20:17

## 2021-03-10 RX ADMIN — VALACYCLOVIR HYDROCHLORIDE 500 MG: 500 TABLET, FILM COATED ORAL at 14:58

## 2021-03-10 RX ADMIN — Medication 10 ML: at 14:58

## 2021-03-10 RX ADMIN — CLINDAMYCIN IN 5 PERCENT DEXTROSE 900 MG: 18 INJECTION, SOLUTION INTRAVENOUS at 06:51

## 2021-03-10 RX ADMIN — ACETAMINOPHEN 650 MG: 325 TABLET ORAL at 06:51

## 2021-03-10 RX ADMIN — CLINDAMYCIN IN 5 PERCENT DEXTROSE 900 MG: 18 INJECTION, SOLUTION INTRAVENOUS at 14:58

## 2021-03-10 RX ADMIN — ACETAMINOPHEN 650 MG: 325 TABLET ORAL at 20:17

## 2021-03-10 RX ADMIN — VENLAFAXINE HYDROCHLORIDE 150 MG: 150 CAPSULE, EXTENDED RELEASE ORAL at 23:18

## 2021-03-10 RX ADMIN — ACETAMINOPHEN 650 MG: 325 TABLET ORAL at 11:42

## 2021-03-10 NOTE — PROGRESS NOTES
Post-Operative  Day 1    Becca Rivas     Assessment: Post-Op day 1, stable, sp PLTCS for arrest of descent, triple I.    Plan:   1. Routine post-operative care   2. Triple I - remains afebrile. Last febrile to 100.1 3/9 1615. Continue get/clinda x 24 hours post op WBC 17 this am. Repeat tomorrow   3. Mild AST elevation this am. Repeat tomorrow. Bps normal.       Information for the patient's :  Layne Hutchins [[id number]]   , Low Transverse    Patient doing well without significant complaint. Nausea and vomiting resolved, tolerating liquids, no flatus, bolivar in place. Vitals:  Visit Vitals  /76 (BP 1 Location: Right upper arm, BP Patient Position: At rest)   Pulse 80   Temp 98 °F (36.7 °C)   Resp 16   Ht 5' 4\" (1.626 m)   Wt 78 kg (172 lb)   SpO2 97%   Breastfeeding Yes   BMI 29.52 kg/m²     Temp (24hrs), Av.4 °F (36.9 °C), Min:97.8 °F (36.6 °C), Max:100.1 °F (37.8 °C)      Last 24hr Input/Output:    Intake/Output Summary (Last 24 hours) at 3/10/2021 1047  Last data filed at 3/10/2021 0810  Gross per 24 hour   Intake 1000 ml   Output 6885 ml   Net -5885 ml          Exam:        Patient without distress. Lungs clear. Abdomen, bowel sounds present, soft, expected tenderness, fundus firm Wound dressing intact     Perineum normal lochia noted               Lower extremities are negative for swelling, cords or tenderness.     Labs:   Lab Results   Component Value Date/Time    WBC 17.0 (H) 03/10/2021 06:31 AM    WBC 11.7 (H) 2021 02:09 PM    HGB 11.3 (L) 03/10/2021 06:31 AM    HGB 13.0 2021 02:09 PM    HCT 34.1 (L) 03/10/2021 06:31 AM    HCT 38.5 2021 02:09 PM    PLATELET 683  06:31 AM    PLATELET 217  02:09 PM       Recent Results (from the past 24 hour(s))   METABOLIC PANEL, COMPREHENSIVE    Collection Time: 03/10/21  2:53 AM   Result Value Ref Range    Sodium 139 136 - 145 mmol/L    Potassium 4.1 3.5 - 5.1 mmol/L Chloride 109 (H) 97 - 108 mmol/L    CO2 25 21 - 32 mmol/L    Anion gap 5 5 - 15 mmol/L    Glucose 70 65 - 100 mg/dL    BUN 12 6 - 20 MG/DL    Creatinine 0.97 0.55 - 1.02 MG/DL    BUN/Creatinine ratio 12 12 - 20      GFR est AA >60 >60 ml/min/1.73m2    GFR est non-AA >60 >60 ml/min/1.73m2    Calcium 8.5 8.5 - 10.1 MG/DL    Bilirubin, total 0.5 0.2 - 1.0 MG/DL    ALT (SGPT) 27 12 - 78 U/L    AST (SGOT) 48 (H) 15 - 37 U/L    Alk.  phosphatase 174 (H) 45 - 117 U/L    Protein, total 5.7 (L) 6.4 - 8.2 g/dL    Albumin 1.9 (L) 3.5 - 5.0 g/dL    Globulin 3.8 2.0 - 4.0 g/dL    A-G Ratio 0.5 (L) 1.1 - 2.2     CBC W/O DIFF    Collection Time: 03/10/21  6:31 AM   Result Value Ref Range    WBC 17.0 (H) 3.6 - 11.0 K/uL    RBC 3.63 (L) 3.80 - 5.20 M/uL    HGB 11.3 (L) 11.5 - 16.0 g/dL    HCT 34.1 (L) 35.0 - 47.0 %    MCV 93.9 80.0 - 99.0 FL    MCH 31.1 26.0 - 34.0 PG    MCHC 33.1 30.0 - 36.5 g/dL    RDW 13.7 11.5 - 14.5 %    PLATELET 417 755 - 734 K/uL    MPV 11.7 8.9 - 12.9 FL    NRBC 0.0 0  WBC    ABSOLUTE NRBC 0.00 0.00 - 0.01 K/uL

## 2021-03-10 NOTE — PROGRESS NOTES
Bedside shift change report given to PAULO Morris RN (oncoming nurse) by Shaggy Huitron (offgoing nurse). Report included the following information SBAR, Intake/Output, MAR and Recent Results.

## 2021-03-10 NOTE — PROGRESS NOTES
Duramorph Follow-Up Note    1 Day Post-Op sp Procedure(s):   SECTION. /76 (BP 1 Location: Right upper arm, BP Patient Position: At rest)   Pulse 80   Temp 36.7 °C (98 °F)   Resp 16   Ht 5' 4\" (1.626 m)   Wt 78 kg (172 lb)   SpO2 97%   Breastfeeding Yes   BMI 29.52 kg/m² . Patient is POD-1 S/P epidural duramorph. Pain is well controlled  Patient reports no headache, fever, weakness or numbness. Epidural/spinal tap site is clean, dry and intact. No obvious Anesthesia complications noted. Plan:    Pain management as per primary service.

## 2021-03-10 NOTE — ROUTINE PROCESS
Bedside shift change report given to BRITT Lo (oncoming nurse) by TANNER Reza RN (offgoing nurse). Report included the following information SBAR, Kardex, Intake/Output, MAR, and Recent Results.

## 2021-03-11 VITALS
RESPIRATION RATE: 16 BRPM | HEIGHT: 64 IN | WEIGHT: 172 LBS | BODY MASS INDEX: 29.37 KG/M2 | SYSTOLIC BLOOD PRESSURE: 114 MMHG | OXYGEN SATURATION: 97 % | HEART RATE: 89 BPM | DIASTOLIC BLOOD PRESSURE: 59 MMHG | TEMPERATURE: 97.9 F

## 2021-03-11 LAB
ALBUMIN SERPL-MCNC: 1.7 G/DL (ref 3.5–5)
ALBUMIN/GLOB SERPL: 0.5 {RATIO} (ref 1.1–2.2)
ALP SERPL-CCNC: 135 U/L (ref 45–117)
ALT SERPL-CCNC: 27 U/L (ref 12–78)
ANION GAP SERPL CALC-SCNC: 6 MMOL/L (ref 5–15)
AST SERPL-CCNC: 46 U/L (ref 15–37)
BASOPHILS # BLD: 0.2 K/UL (ref 0–0.1)
BASOPHILS NFR BLD: 1 % (ref 0–1)
BILIRUB SERPL-MCNC: 0.2 MG/DL (ref 0.2–1)
BUN SERPL-MCNC: 16 MG/DL (ref 6–20)
BUN/CREAT SERPL: 17 (ref 12–20)
CALCIUM SERPL-MCNC: 8.1 MG/DL (ref 8.5–10.1)
CHLORIDE SERPL-SCNC: 108 MMOL/L (ref 97–108)
CO2 SERPL-SCNC: 26 MMOL/L (ref 21–32)
CREAT SERPL-MCNC: 0.95 MG/DL (ref 0.55–1.02)
DIFFERENTIAL METHOD BLD: ABNORMAL
EOSINOPHIL # BLD: 0 K/UL (ref 0–0.4)
EOSINOPHIL NFR BLD: 0 % (ref 0–7)
ERYTHROCYTE [DISTWIDTH] IN BLOOD BY AUTOMATED COUNT: 13.6 % (ref 11.5–14.5)
GLOBULIN SER CALC-MCNC: 3.5 G/DL (ref 2–4)
GLUCOSE SERPL-MCNC: 100 MG/DL (ref 65–100)
HCT VFR BLD AUTO: 27.2 % (ref 35–47)
HGB BLD-MCNC: 9.3 G/DL (ref 11.5–16)
IMM GRANULOCYTES # BLD AUTO: 0 K/UL (ref 0–0.04)
IMM GRANULOCYTES NFR BLD AUTO: 0 % (ref 0–0.5)
LYMPHOCYTES # BLD: 1.7 K/UL (ref 0.8–3.5)
LYMPHOCYTES NFR BLD: 11 % (ref 12–49)
MCH RBC QN AUTO: 31.6 PG (ref 26–34)
MCHC RBC AUTO-ENTMCNC: 34.2 G/DL (ref 30–36.5)
MCV RBC AUTO: 92.5 FL (ref 80–99)
MONOCYTES # BLD: 0.3 K/UL (ref 0–1)
MONOCYTES NFR BLD: 2 % (ref 5–13)
NEUTS BAND NFR BLD MANUAL: 6 %
NEUTS SEG # BLD: 13.1 K/UL (ref 1.8–8)
NEUTS SEG NFR BLD: 80 % (ref 32–75)
NRBC # BLD: 0 K/UL (ref 0–0.01)
NRBC BLD-RTO: 0 PER 100 WBC
PLATELET # BLD AUTO: 178 K/UL (ref 150–400)
PMV BLD AUTO: 11.2 FL (ref 8.9–12.9)
POTASSIUM SERPL-SCNC: 3.7 MMOL/L (ref 3.5–5.1)
PROT SERPL-MCNC: 5.2 G/DL (ref 6.4–8.2)
RBC # BLD AUTO: 2.94 M/UL (ref 3.8–5.2)
RBC MORPH BLD: ABNORMAL
SODIUM SERPL-SCNC: 140 MMOL/L (ref 136–145)
WBC # BLD AUTO: 15.3 K/UL (ref 3.6–11)

## 2021-03-11 PROCEDURE — 74011250637 HC RX REV CODE- 250/637: Performed by: OBSTETRICS & GYNECOLOGY

## 2021-03-11 PROCEDURE — 36415 COLL VENOUS BLD VENIPUNCTURE: CPT

## 2021-03-11 PROCEDURE — 80053 COMPREHEN METABOLIC PANEL: CPT

## 2021-03-11 PROCEDURE — 74011250637 HC RX REV CODE- 250/637: Performed by: STUDENT IN AN ORGANIZED HEALTH CARE EDUCATION/TRAINING PROGRAM

## 2021-03-11 PROCEDURE — 74011250637 HC RX REV CODE- 250/637

## 2021-03-11 PROCEDURE — 85025 COMPLETE CBC W/AUTO DIFF WBC: CPT

## 2021-03-11 RX ORDER — OXYCODONE HYDROCHLORIDE 5 MG/1
5 TABLET ORAL
Qty: 12 TAB | Refills: 0 | Status: SHIPPED | OUTPATIENT
Start: 2021-03-11 | End: 2021-03-14

## 2021-03-11 RX ORDER — ACETAMINOPHEN 500 MG
1000 TABLET ORAL
Qty: 60 TAB | Refills: 0 | Status: SHIPPED
Start: 2021-03-11

## 2021-03-11 RX ORDER — IBUPROFEN 800 MG/1
800 TABLET ORAL EVERY 8 HOURS
Qty: 60 TAB | Refills: 0 | Status: SHIPPED | OUTPATIENT
Start: 2021-03-11

## 2021-03-11 RX ORDER — DOCUSATE SODIUM 100 MG/1
100 CAPSULE, LIQUID FILLED ORAL 2 TIMES DAILY
Qty: 60 CAP | Refills: 2 | Status: SHIPPED | OUTPATIENT
Start: 2021-03-11 | End: 2021-06-09

## 2021-03-11 RX ADMIN — ACETAMINOPHEN 650 MG: 325 TABLET ORAL at 16:05

## 2021-03-11 RX ADMIN — ACETAMINOPHEN 650 MG: 325 TABLET ORAL at 07:01

## 2021-03-11 RX ADMIN — OXYCODONE 5 MG: 5 TABLET ORAL at 16:04

## 2021-03-11 RX ADMIN — OXYCODONE 5 MG: 5 TABLET ORAL at 11:00

## 2021-03-11 RX ADMIN — OXYCODONE 5 MG: 5 TABLET ORAL at 00:24

## 2021-03-11 RX ADMIN — ACETAMINOPHEN 650 MG: 325 TABLET ORAL at 00:24

## 2021-03-11 RX ADMIN — IBUPROFEN 800 MG: 400 TABLET, FILM COATED ORAL at 03:46

## 2021-03-11 RX ADMIN — Medication 5 ML: at 07:03

## 2021-03-11 RX ADMIN — OXYCODONE 5 MG: 5 TABLET ORAL at 07:01

## 2021-03-11 RX ADMIN — ACETAMINOPHEN 650 MG: 325 TABLET ORAL at 11:03

## 2021-03-11 RX ADMIN — IBUPROFEN 800 MG: 400 TABLET, FILM COATED ORAL at 11:00

## 2021-03-11 NOTE — DISCHARGE INSTRUCTIONS
Patient Education         Section: What to Expect at Home  Your Recovery     A  section, or , is surgery to deliver your baby through a cut that the doctor makes in your lower belly and uterus. The cut is called an incision. You may have some pain in your lower belly and need pain medicine for 1 to 2 weeks. You can expect some vaginal bleeding for several weeks. You will probably need about 6 weeks to fully recover. It's important to take it easy while the incision heals. Avoid heavy lifting, strenuous activities, and exercises that strain the belly muscles while you recover. Ask a family member or friend for help with housework, cooking, and shopping. This care sheet gives you a general idea about how long it will take for you to recover. But each person recovers at a different pace. Follow the steps below to get better as quickly as possible. How can you care for yourself at home? Activity    · Rest when you feel tired. Getting enough sleep will help you recover.     · Try to walk each day. Start by walking a little more than you did the day before. Bit by bit, increase the amount you walk. Walking boosts blood flow and helps prevent pneumonia, constipation, and blood clots.     · Avoid strenuous activities, such as bicycle riding, jogging, weightlifting, and aerobic exercise, for 6 weeks or until your doctor says it is okay.     · Until your doctor says it is okay, do not lift anything heavier than your baby.     · Do not do sit-ups or other exercises that strain the belly muscles for 6 weeks or until your doctor says it is okay.     · Hold a pillow over your incision when you cough or take deep breaths. This will support your belly and decrease your pain.     · You may shower as usual. Pat the incision dry when you are done.     · You will have some vaginal bleeding. Wear sanitary pads.  Do not douche or use tampons until your doctor says it is okay.     · Ask your doctor when you can drive again.     · You will probably need to take at least 6 weeks off work. It depends on the type of work you do and how you feel.     · Ask your doctor when it is okay for you to have sex. Diet    · You can eat your normal diet. If your stomach is upset, try bland, low-fat foods like plain rice, broiled chicken, toast, and yogurt.     · Drink plenty of fluids (unless your doctor tells you not to).     · You may notice that your bowel movements are not regular right after your surgery. This is common. Try to avoid constipation and straining with bowel movements. You may want to take a fiber supplement every day. If you have not had a bowel movement after a couple of days, ask your doctor about taking a mild laxative.     · If you are breastfeeding, limit alcohol. Alcohol can cause a lack of energy and other health problems for the baby when a breastfeeding woman drinks heavily. It can also get in the way of a mom's ability to feed her baby or to care for the child in other ways. There isn't a lot of research about exactly how much alcohol can harm a baby. Having no alcohol is the safest choice for your baby. If you choose to have a drink now and then, have only one drink, and limit the number of occasions that you have a drink. Wait to breastfeed at least 2 hours after you have a drink to reduce the amount of alcohol the baby may get in the milk. Medicines    · Your doctor will tell you if and when you can restart your medicines. He or she will also give you instructions about taking any new medicines.     · If you take aspirin or some other blood thinner, ask your doctor if and when to start taking it again. Make sure that you understand exactly what your doctor wants you to do.     · Take pain medicines exactly as directed. ? If the doctor gave you a prescription medicine for pain, take it as prescribed.   ? If you are not taking a prescription pain medicine, ask your doctor if you can take an over-the-counter medicine.     · If you think your pain medicine is making you sick to your stomach:  ? Take your medicine after meals (unless your doctor has told you not to). ? Ask your doctor for a different pain medicine.     · If your doctor prescribed antibiotics, take them as directed. Do not stop taking them just because you feel better. You need to take the full course of antibiotics. Incision care    · If you have strips of tape on the incision, leave the tape on for a week or until it falls off.     · Wash the area daily with warm, soapy water, and pat it dry. Don't use hydrogen peroxide or alcohol, which can slow healing. You may cover the area with a gauze bandage if it weeps or rubs against clothing. Change the bandage every day.     · Keep the area clean and dry. Other instructions    · If you breastfeed your baby, you may be more comfortable while you are healing if you place the baby so that he or she is not resting on your belly. Try tucking your baby under your arm, with his or her body along the side you will be feeding on. Support your baby's upper body with your arm. With that hand you can control your baby's head to bring his or her mouth to your breast. This is sometimes called the football hold. Follow-up care is a key part of your treatment and safety. Be sure to make and go to all appointments, and call your doctor if you are having problems. It's also a good idea to know your test results and keep a list of the medicines you take. When should you call for help? Call  911 anytime you think you may need emergency care. For example, call if:    · You have thoughts of harming yourself, your baby, or another person.     · You passed out (lost consciousness).     · You have chest pain, are short of breath, or cough up blood.     · You have a seizure.    Call your doctor now or seek immediate medical care if:    · You have pain that does not get better after you take pain medicine.     · You have severe vaginal bleeding.     · You are dizzy or lightheaded, or you feel like you may faint.     · You have new or worse pain in your belly or pelvis.     · You have loose stitches, or your incision comes open.     · You have symptoms of infection, such as:  ? Increased pain, swelling, warmth, or redness. ? Red streaks leading from the incision. ? Pus draining from the incision. ? A fever.     · You have symptoms of a blood clot in your leg (called a deep vein thrombosis), such as:  ? Pain in your calf, back of the knee, thigh, or groin. ? Redness and swelling in your leg or groin.     · You have signs of preeclampsia, such as:  ? Sudden swelling of your face, hands, or feet. ? New vision problems (such as dimness, blurring, or seeing spots). ? A severe headache. Watch closely for changes in your health, and be sure to contact your doctor if:    · You do not get better as expected. Where can you learn more? Go to http://www.pena.com/  Enter M806 in the search box to learn more about \" Section: What to Expect at Home. \"  Current as of: 2020               Content Version: 12.6  © 1538-8108 [employer]. Care instructions adapted under license by [employer] (which disclaims liability or warranty for this information). If you have questions about a medical condition or this instruction, always ask your healthcare professional. [place] [address] any warranty or liability for your use of this information.

## 2021-03-11 NOTE — ROUTINE PROCESS
SBAR report to BRANDIE Moon RN. [Initial Visit] : initial GYN visit [FreeTextEntry1] : painful periods wants ocp

## 2021-03-11 NOTE — DISCHARGE SUMMARY
Obstetrical Discharge Summary     Name: Mortimer Grimmer MRN: [id number]  SSN: [id number]    YOB: 1985  Age: 28 y.o. Sex: female      Admit Date: 3/8/2021    Discharge Date: 3/11/2021     Admitting Physician: Jumana Fleming MD     Attending Physician:  Flavia Rosales MD     Admission Diagnoses: Term pregnancy [Z34.90]    Discharge Diagnoses:   Information for the patient's :  Humberto Mina [[id number]]   Delivery of a 3.52 kg female infant via , Low Transverse on 3/9/2021 at 3:31 PM  by Tomas Knee. Apgars were   and  . Additional Diagnoses:   Hospital Problems  Never Reviewed          Codes Class Noted POA    Term pregnancy ICD-10-CM: Z34.90  ICD-9-CM: V22.1  3/8/2021 Unknown             Lab Results   Component Value Date/Time    Rubella, External immune 2020    GrBStrep, External negative 02/15/2021       Hospital Course: Postpartum course was complicated by triple I, for which she was treated with gent and clinda for 24 hours postpartum, remained afebrile for another 24 hours and was discharged home on POD#2. Disposition at Discharge: Home or self care    Discharged Condition: Stable    Patient Instructions:   Current Discharge Medication List      START taking these medications    Details   ibuprofen (MOTRIN) 800 mg tablet Take 1 Tab by mouth every eight (8) hours. Qty: 60 Tab, Refills: 0      oxyCODONE IR (ROXICODONE) 5 mg immediate release tablet Take 1 Tab by mouth every four (4) hours as needed (severe pain) for up to 3 days. Max Daily Amount: 30 mg.  Qty: 12 Tab, Refills: 0    Associated Diagnoses: S/P       docusate sodium (Colace) 100 mg capsule Take 1 Cap by mouth two (2) times a day for 90 days. Qty: 60 Cap, Refills: 2      acetaminophen (Acetaminophen Extra Strength) 500 mg tablet Take 2 Tabs by mouth every eight (8) hours as needed for Pain.   Qty: 60 Tab, Refills: 0         CONTINUE these medications which have NOT CHANGED    Details   PNV UV.62/NKTERRL fum/folic ac (PRENATAL PO) Take 1 Tab by mouth daily. Venlafaxine-ER 24 HR (EFFEXOR-ER) 150 mg tr24 tablet Take 150 mg by mouth daily. STOP taking these medications       valACYclovir (Valtrex) 500 mg tablet Comments:   Reason for Stopping:               Reference my discharge instructions. Follow-up Appointments   Procedures    FOLLOW UP VISIT Appointment in: Two Weeks     Standing Status:   Standing     Number of Occurrences:   1     Order Specific Question:   Appointment in     Answer:    Two Weeks        Signed By:  Hyun Hill MD     March 11, 2021

## 2021-03-11 NOTE — PROGRESS NOTES
Post-Operative  Day 2    Becca Rivas     Assessment: Post-Op day 2, doing well sp PLTCS for arrest of descent, triple I. Abx discontinued yesterday @ 1500. Plan:   1. Routine post-operative care  2. Triple I - afebrile, Tlast 3/9 @ 1615, s/p 24 hours gent/clinda, discontinued yesterday at 1500, will monitor today if remains afebrile for 24 hours after abx discontinued will likely plan for discharge home. WBC 15.3 today down from 17.  3. Mild AST elevation noted yesterday to 48, repeated today 46, consider repeat CMP at postpartum visit   4. Hx depression well controlled on effexor, to continue     Information for the patient's :  Zacarias Line [[id number]]   , Low Transverse    Patient doing well without significant complaint. Nausea and vomiting resolved, tolerating liquids, passing flatus, voiding and ambulating without difficulty. Vitals:  Visit Vitals  /69 (BP 1 Location: Right arm, BP Patient Position: Sitting)   Pulse 98   Temp 97.8 °F (36.6 °C)   Resp 16   Ht 5' 4\" (1.626 m)   Wt 78 kg (172 lb)   SpO2 97%   Breastfeeding Yes   BMI 29.52 kg/m²     Temp (24hrs), Av.9 °F (36.6 °C), Min:97.7 °F (36.5 °C), Max:98 °F (36.7 °C)        Exam:        Patient without distress. Abdomen, bowel sounds present, soft, expected tenderness, fundus firm                Wound incision clean, dry and intact               Lower extremities are negative for swelling, cords or tenderness.     Labs:   Lab Results   Component Value Date/Time    WBC 15.3 (H) 2021 12:13 AM    WBC 17.0 (H) 03/10/2021 06:31 AM    WBC 11.7 (H) 2021 02:09 PM    HGB 9.3 (L) 2021 12:13 AM    HGB 11.3 (L) 03/10/2021 06:31 AM    HGB 13.0 2021 02:09 PM    HCT 27.2 (L) 2021 12:13 AM    HCT 34.1 (L) 03/10/2021 06:31 AM    HCT 38.5 2021 02:09 PM    PLATELET 652  12:13 AM    PLATELET 880  06:31 AM    PLATELET 917  02:09 PM Recent Results (from the past 24 hour(s))   CBC WITH AUTOMATED DIFF    Collection Time: 03/11/21 12:13 AM   Result Value Ref Range    WBC 15.3 (H) 3.6 - 11.0 K/uL    RBC 2.94 (L) 3.80 - 5.20 M/uL    HGB 9.3 (L) 11.5 - 16.0 g/dL    HCT 27.2 (L) 35.0 - 47.0 %    MCV 92.5 80.0 - 99.0 FL    MCH 31.6 26.0 - 34.0 PG    MCHC 34.2 30.0 - 36.5 g/dL    RDW 13.6 11.5 - 14.5 %    PLATELET 595 620 - 460 K/uL    MPV 11.2 8.9 - 12.9 FL    NRBC 0.0 0  WBC    ABSOLUTE NRBC 0.00 0.00 - 0.01 K/uL    NEUTROPHILS 80 (H) 32 - 75 %    BAND NEUTROPHILS 6 %    LYMPHOCYTES 11 (L) 12 - 49 %    MONOCYTES 2 (L) 5 - 13 %    EOSINOPHILS 0 0 - 7 %    BASOPHILS 1 0 - 1 %    IMMATURE GRANULOCYTES 0 0.0 - 0.5 %    ABS. NEUTROPHILS 13.1 (H) 1.8 - 8.0 K/UL    ABS. LYMPHOCYTES 1.7 0.8 - 3.5 K/UL    ABS. MONOCYTES 0.3 0.0 - 1.0 K/UL    ABS. EOSINOPHILS 0.0 0.0 - 0.4 K/UL    ABS. BASOPHILS 0.2 (H) 0.0 - 0.1 K/UL    ABS. IMM. GRANS. 0.0 0.00 - 0.04 K/UL    DF MANUAL      RBC COMMENTS NORMOCYTIC, NORMOCHROMIC     METABOLIC PANEL, COMPREHENSIVE    Collection Time: 03/11/21 12:13 AM   Result Value Ref Range    Sodium 140 136 - 145 mmol/L    Potassium 3.7 3.5 - 5.1 mmol/L    Chloride 108 97 - 108 mmol/L    CO2 26 21 - 32 mmol/L    Anion gap 6 5 - 15 mmol/L    Glucose 100 65 - 100 mg/dL    BUN 16 6 - 20 MG/DL    Creatinine 0.95 0.55 - 1.02 MG/DL    BUN/Creatinine ratio 17 12 - 20      GFR est AA >60 >60 ml/min/1.73m2    GFR est non-AA >60 >60 ml/min/1.73m2    Calcium 8.1 (L) 8.5 - 10.1 MG/DL    Bilirubin, total 0.2 0.2 - 1.0 MG/DL    ALT (SGPT) 27 12 - 78 U/L    AST (SGOT) 46 (H) 15 - 37 U/L    Alk.  phosphatase 135 (H) 45 - 117 U/L    Protein, total 5.2 (L) 6.4 - 8.2 g/dL    Albumin 1.7 (L) 3.5 - 5.0 g/dL    Globulin 3.5 2.0 - 4.0 g/dL    A-G Ratio 0.5 (L) 1.1 - 2.2

## 2021-03-11 NOTE — PROGRESS NOTES
Bedside shift change report given to PAULO Crespo (oncoming nurse) by [employer] (offgoing nurse). Report included the following information SBAR, Intake/Output, MAR and Recent Results.

## 2021-03-12 NOTE — PROGRESS NOTES
I have reviewed discharge instructions with Patient and spouse. Patient and spouse verbalized understanding. Time was provided for questions. Patient given copy of discharge instructions. Prescriptions sent to pharmacy    Patient discharged home with designated caregiver.

## 2022-03-18 PROBLEM — Z34.90 TERM PREGNANCY: Status: ACTIVE | Noted: 2021-03-08

## 2023-05-17 RX ORDER — ACETAMINOPHEN 500 MG
1000 TABLET ORAL EVERY 8 HOURS PRN
COMMUNITY
Start: 2021-03-11

## 2023-05-17 RX ORDER — IBUPROFEN 800 MG/1
800 TABLET ORAL EVERY 8 HOURS
COMMUNITY
Start: 2021-03-11

## 2023-05-17 RX ORDER — VENLAFAXINE HYDROCHLORIDE 150 MG/1
150 TABLET, EXTENDED RELEASE ORAL DAILY
COMMUNITY

## 2024-08-06 ENCOUNTER — HOSPITAL ENCOUNTER (OUTPATIENT)
Facility: HOSPITAL | Age: 39
Discharge: HOME | End: 2024-08-09
Payer: COMMERCIAL

## 2024-08-06 ENCOUNTER — TRANSCRIBE ORDERS (OUTPATIENT)
Facility: HOSPITAL | Age: 39
End: 2024-08-06

## 2024-08-06 DIAGNOSIS — M79.662 PAIN OF LEFT CALF: Primary | ICD-10-CM

## 2024-08-06 DIAGNOSIS — M79.662 PAIN OF LEFT CALF: ICD-10-CM

## 2024-08-06 PROCEDURE — 93971 EXTREMITY STUDY: CPT

## 2024-12-17 ENCOUNTER — TRANSCRIBE ORDERS (OUTPATIENT)
Facility: HOSPITAL | Age: 39
End: 2024-12-17

## 2024-12-17 DIAGNOSIS — M54.30 SCIATICA, UNSPECIFIED LATERALITY: Primary | ICD-10-CM

## 2024-12-17 LAB
ABO, EXTERNAL RESULT: NORMAL
C. TRACHOMATIS, EXTERNAL RESULT: NEGATIVE
HEP B, EXTERNAL RESULT: NORMAL
HEPATITIS C ANTIBODY, EXTERNAL RESULT: NORMAL
HIV, EXTERNAL RESULT: NORMAL
N. GONORRHOEAE, EXTERNAL RESULT: NEGATIVE
RH FACTOR, EXTERNAL RESULT: POSITIVE
RUBELLA TITER, EXTERNAL RESULT: NORMAL
T. PALLIDUM (SYPHILIS) ANTIBODY, EXTERNAL RESULT: NORMAL

## 2025-01-20 ENCOUNTER — HOSPITAL ENCOUNTER (OUTPATIENT)
Facility: HOSPITAL | Age: 40
Discharge: HOME | End: 2025-01-23
Attending: NEUROLOGICAL SURGERY
Payer: COMMERCIAL

## 2025-01-20 DIAGNOSIS — M54.30 SCIATICA, UNSPECIFIED LATERALITY: ICD-10-CM

## 2025-01-20 PROCEDURE — 72148 MRI LUMBAR SPINE W/O DYE: CPT

## 2025-06-04 LAB — GBS, EXTERNAL RESULT: NEGATIVE

## 2025-06-25 ENCOUNTER — ANESTHESIA EVENT (OUTPATIENT)
Dept: LABOR AND DELIVERY | Facility: HOSPITAL | Age: 40
End: 2025-06-25
Payer: COMMERCIAL

## 2025-06-25 ENCOUNTER — HOSPITAL ENCOUNTER (INPATIENT)
Facility: HOSPITAL | Age: 40
LOS: 2 days | Discharge: HOME | End: 2025-06-27
Attending: OBSTETRICS & GYNECOLOGY | Admitting: OBSTETRICS & GYNECOLOGY
Payer: COMMERCIAL

## 2025-06-25 ENCOUNTER — ANESTHESIA (OUTPATIENT)
Dept: LABOR AND DELIVERY | Facility: HOSPITAL | Age: 40
End: 2025-06-25
Payer: COMMERCIAL

## 2025-06-25 PROBLEM — O34.219 PREVIOUS CESAREAN DELIVERY AFFECTING PREGNANCY: Status: ACTIVE | Noted: 2025-06-25

## 2025-06-25 LAB
ABO + RH BLD: NORMAL
AMPHET UR QL SCN: NEGATIVE
BARBITURATES UR QL SCN: NEGATIVE
BASOPHILS # BLD: 0.04 K/UL (ref 0–0.1)
BASOPHILS NFR BLD: 0.4 % (ref 0–1)
BENZODIAZ UR QL: NEGATIVE
BLOOD GROUP ANTIBODIES SERPL: NORMAL
CANNABINOIDS UR QL SCN: NEGATIVE
COCAINE UR QL SCN: NEGATIVE
DIFFERENTIAL METHOD BLD: ABNORMAL
EOSINOPHIL # BLD: 0.06 K/UL (ref 0–0.4)
EOSINOPHIL NFR BLD: 0.7 % (ref 0–7)
ERYTHROCYTE [DISTWIDTH] IN BLOOD BY AUTOMATED COUNT: 14 % (ref 11.5–14.5)
HCT VFR BLD AUTO: 35.2 % (ref 35–47)
HGB BLD-MCNC: 11.8 G/DL (ref 11.5–16)
IMM GRANULOCYTES # BLD AUTO: 0.09 K/UL (ref 0–0.04)
IMM GRANULOCYTES NFR BLD AUTO: 1 % (ref 0–0.5)
LYMPHOCYTES # BLD: 2.16 K/UL (ref 0.8–3.5)
LYMPHOCYTES NFR BLD: 23.6 % (ref 12–49)
Lab: NORMAL
MCH RBC QN AUTO: 30 PG (ref 26–34)
MCHC RBC AUTO-ENTMCNC: 33.5 G/DL (ref 30–36.5)
MCV RBC AUTO: 89.6 FL (ref 80–99)
METHADONE UR QL: NEGATIVE
MONOCYTES # BLD: 0.44 K/UL (ref 0–1)
MONOCYTES NFR BLD: 4.8 % (ref 5–13)
NEUTS SEG # BLD: 6.36 K/UL (ref 1.8–8)
NEUTS SEG NFR BLD: 69.5 % (ref 32–75)
NRBC # BLD: 0 K/UL (ref 0–0.01)
NRBC BLD-RTO: 0 PER 100 WBC
OPIATES UR QL: NEGATIVE
PCP UR QL: NEGATIVE
PLATELET # BLD AUTO: 184 K/UL (ref 150–400)
PMV BLD AUTO: 10.9 FL (ref 8.9–12.9)
RBC # BLD AUTO: 3.93 M/UL (ref 3.8–5.2)
SPECIMEN EXP DATE BLD: NORMAL
WBC # BLD AUTO: 9.2 K/UL (ref 3.6–11)

## 2025-06-25 PROCEDURE — 86900 BLOOD TYPING SEROLOGIC ABO: CPT

## 2025-06-25 PROCEDURE — 1120000000 HC RM PRIVATE OB

## 2025-06-25 PROCEDURE — 51702 INSERT TEMP BLADDER CATH: CPT

## 2025-06-25 PROCEDURE — 2580000003 HC RX 258: Performed by: OBSTETRICS & GYNECOLOGY

## 2025-06-25 PROCEDURE — 86901 BLOOD TYPING SEROLOGIC RH(D): CPT

## 2025-06-25 PROCEDURE — 2720000010 HC SURG SUPPLY STERILE: Performed by: OBSTETRICS & GYNECOLOGY

## 2025-06-25 PROCEDURE — 2500000003 HC RX 250 WO HCPCS: Performed by: REGISTERED NURSE

## 2025-06-25 PROCEDURE — 80307 DRUG TEST PRSMV CHEM ANLYZR: CPT

## 2025-06-25 PROCEDURE — 2709999900 HC NON-CHARGEABLE SUPPLY: Performed by: OBSTETRICS & GYNECOLOGY

## 2025-06-25 PROCEDURE — 6360000002 HC RX W HCPCS: Performed by: ANESTHESIOLOGY

## 2025-06-25 PROCEDURE — 6360000002 HC RX W HCPCS: Performed by: OBSTETRICS & GYNECOLOGY

## 2025-06-25 PROCEDURE — 3700000001 HC ADD 15 MINUTES (ANESTHESIA): Performed by: OBSTETRICS & GYNECOLOGY

## 2025-06-25 PROCEDURE — 7100000000 HC PACU RECOVERY - FIRST 15 MIN: Performed by: OBSTETRICS & GYNECOLOGY

## 2025-06-25 PROCEDURE — 6370000000 HC RX 637 (ALT 250 FOR IP): Performed by: OBSTETRICS & GYNECOLOGY

## 2025-06-25 PROCEDURE — 2500000003 HC RX 250 WO HCPCS: Performed by: OBSTETRICS & GYNECOLOGY

## 2025-06-25 PROCEDURE — 2580000003 HC RX 258: Performed by: ANESTHESIOLOGY

## 2025-06-25 PROCEDURE — 88302 TISSUE EXAM BY PATHOLOGIST: CPT

## 2025-06-25 PROCEDURE — 36415 COLL VENOUS BLD VENIPUNCTURE: CPT

## 2025-06-25 PROCEDURE — 3609079900 HC CESAREAN SECTION: Performed by: OBSTETRICS & GYNECOLOGY

## 2025-06-25 PROCEDURE — 6360000002 HC RX W HCPCS: Performed by: REGISTERED NURSE

## 2025-06-25 PROCEDURE — 86850 RBC ANTIBODY SCREEN: CPT

## 2025-06-25 PROCEDURE — 3700000000 HC ANESTHESIA ATTENDED CARE: Performed by: OBSTETRICS & GYNECOLOGY

## 2025-06-25 PROCEDURE — 3700000156 HC EPIDURAL ANESTHESIA

## 2025-06-25 PROCEDURE — 85025 COMPLETE CBC W/AUTO DIFF WBC: CPT

## 2025-06-25 PROCEDURE — 7100000001 HC PACU RECOVERY - ADDTL 15 MIN: Performed by: OBSTETRICS & GYNECOLOGY

## 2025-06-25 PROCEDURE — 0UT70ZZ RESECTION OF BILATERAL FALLOPIAN TUBES, OPEN APPROACH: ICD-10-PCS | Performed by: OBSTETRICS & GYNECOLOGY

## 2025-06-25 RX ORDER — OXYCODONE HYDROCHLORIDE 5 MG/1
10 TABLET ORAL EVERY 4 HOURS PRN
Refills: 0 | Status: DISCONTINUED | OUTPATIENT
Start: 2025-06-25 | End: 2025-06-27 | Stop reason: HOSPADM

## 2025-06-25 RX ORDER — VALACYCLOVIR HYDROCHLORIDE 500 MG/1
500 TABLET, FILM COATED ORAL 2 TIMES DAILY
COMMUNITY

## 2025-06-25 RX ORDER — ONDANSETRON 2 MG/ML
INJECTION INTRAMUSCULAR; INTRAVENOUS
Status: DISCONTINUED | OUTPATIENT
Start: 2025-06-25 | End: 2025-06-25 | Stop reason: SDUPTHER

## 2025-06-25 RX ORDER — KETOROLAC TROMETHAMINE 30 MG/ML
INJECTION, SOLUTION INTRAMUSCULAR; INTRAVENOUS
Status: DISCONTINUED | OUTPATIENT
Start: 2025-06-25 | End: 2025-06-25 | Stop reason: SDUPTHER

## 2025-06-25 RX ORDER — ONDANSETRON 2 MG/ML
4 INJECTION INTRAMUSCULAR; INTRAVENOUS EVERY 6 HOURS PRN
Status: DISCONTINUED | OUTPATIENT
Start: 2025-06-25 | End: 2025-06-26

## 2025-06-25 RX ORDER — SODIUM CHLORIDE, SODIUM LACTATE, POTASSIUM CHLORIDE, AND CALCIUM CHLORIDE .6; .31; .03; .02 G/100ML; G/100ML; G/100ML; G/100ML
1000 INJECTION, SOLUTION INTRAVENOUS ONCE
Status: COMPLETED | OUTPATIENT
Start: 2025-06-25 | End: 2025-06-25

## 2025-06-25 RX ORDER — SODIUM CHLORIDE 0.9 % (FLUSH) 0.9 %
5-40 SYRINGE (ML) INJECTION EVERY 12 HOURS SCHEDULED
Status: DISCONTINUED | OUTPATIENT
Start: 2025-06-25 | End: 2025-06-27 | Stop reason: HOSPADM

## 2025-06-25 RX ORDER — DESVENLAFAXINE 50 MG/1
50 TABLET, FILM COATED, EXTENDED RELEASE ORAL DAILY
COMMUNITY

## 2025-06-25 RX ORDER — OXYCODONE HYDROCHLORIDE 5 MG/1
5 TABLET ORAL EVERY 4 HOURS PRN
Refills: 0 | Status: DISCONTINUED | OUTPATIENT
Start: 2025-06-25 | End: 2025-06-27 | Stop reason: HOSPADM

## 2025-06-25 RX ORDER — MORPHINE SULFATE 0.5 MG/ML
INJECTION, SOLUTION EPIDURAL; INTRATHECAL; INTRAVENOUS
Status: COMPLETED | OUTPATIENT
Start: 2025-06-25 | End: 2025-06-25

## 2025-06-25 RX ORDER — IBUPROFEN 400 MG/1
800 TABLET, FILM COATED ORAL EVERY 8 HOURS
Status: DISCONTINUED | OUTPATIENT
Start: 2025-06-26 | End: 2025-06-27 | Stop reason: HOSPADM

## 2025-06-25 RX ORDER — BUPIVACAINE HYDROCHLORIDE 7.5 MG/ML
INJECTION, SOLUTION EPIDURAL; RETROBULBAR
Status: COMPLETED | OUTPATIENT
Start: 2025-06-25 | End: 2025-06-25

## 2025-06-25 RX ORDER — SODIUM CHLORIDE 0.9 % (FLUSH) 0.9 %
10 SYRINGE (ML) INJECTION PRN
Status: DISCONTINUED | OUTPATIENT
Start: 2025-06-25 | End: 2025-06-26

## 2025-06-25 RX ORDER — DOCUSATE SODIUM 100 MG/1
100 CAPSULE, LIQUID FILLED ORAL 2 TIMES DAILY
Status: DISCONTINUED | OUTPATIENT
Start: 2025-06-25 | End: 2025-06-27 | Stop reason: HOSPADM

## 2025-06-25 RX ORDER — SODIUM CHLORIDE 0.9 % (FLUSH) 0.9 %
5-40 SYRINGE (ML) INJECTION EVERY 12 HOURS SCHEDULED
Status: DISCONTINUED | OUTPATIENT
Start: 2025-06-25 | End: 2025-06-26

## 2025-06-25 RX ORDER — SODIUM CHLORIDE 9 MG/ML
INJECTION, SOLUTION INTRAVENOUS PRN
Status: DISCONTINUED | OUTPATIENT
Start: 2025-06-25 | End: 2025-06-27 | Stop reason: HOSPADM

## 2025-06-25 RX ORDER — SODIUM CHLORIDE 9 MG/ML
INJECTION, SOLUTION INTRAVENOUS PRN
Status: DISCONTINUED | OUTPATIENT
Start: 2025-06-25 | End: 2025-06-26

## 2025-06-25 RX ORDER — EPHEDRINE SULFATE/0.9% NACL/PF 25 MG/5 ML
SYRINGE (ML) INTRAVENOUS
Status: DISCONTINUED | OUTPATIENT
Start: 2025-06-25 | End: 2025-06-25 | Stop reason: SDUPTHER

## 2025-06-25 RX ORDER — SODIUM CHLORIDE 0.9 % (FLUSH) 0.9 %
5-40 SYRINGE (ML) INJECTION PRN
Status: DISCONTINUED | OUTPATIENT
Start: 2025-06-25 | End: 2025-06-27 | Stop reason: HOSPADM

## 2025-06-25 RX ORDER — NALOXONE HYDROCHLORIDE 0.4 MG/ML
INJECTION, SOLUTION INTRAMUSCULAR; INTRAVENOUS; SUBCUTANEOUS PRN
Status: DISCONTINUED | OUTPATIENT
Start: 2025-06-25 | End: 2025-06-26

## 2025-06-25 RX ORDER — KETOROLAC TROMETHAMINE 30 MG/ML
30 INJECTION, SOLUTION INTRAMUSCULAR; INTRAVENOUS EVERY 6 HOURS
Status: DISPENSED | OUTPATIENT
Start: 2025-06-25 | End: 2025-06-26

## 2025-06-25 RX ORDER — ONDANSETRON 4 MG/1
4 TABLET, ORALLY DISINTEGRATING ORAL EVERY 8 HOURS PRN
Status: DISCONTINUED | OUTPATIENT
Start: 2025-06-25 | End: 2025-06-27 | Stop reason: HOSPADM

## 2025-06-25 RX ORDER — SODIUM CHLORIDE, SODIUM LACTATE, POTASSIUM CHLORIDE, CALCIUM CHLORIDE 600; 310; 30; 20 MG/100ML; MG/100ML; MG/100ML; MG/100ML
INJECTION, SOLUTION INTRAVENOUS CONTINUOUS
Status: DISCONTINUED | OUTPATIENT
Start: 2025-06-25 | End: 2025-06-27 | Stop reason: HOSPADM

## 2025-06-25 RX ORDER — ACETAMINOPHEN 500 MG
1000 TABLET ORAL EVERY 8 HOURS SCHEDULED
Status: DISCONTINUED | OUTPATIENT
Start: 2025-06-25 | End: 2025-06-27 | Stop reason: HOSPADM

## 2025-06-25 RX ORDER — ONDANSETRON 2 MG/ML
4 INJECTION INTRAMUSCULAR; INTRAVENOUS EVERY 6 HOURS PRN
Status: DISCONTINUED | OUTPATIENT
Start: 2025-06-25 | End: 2025-06-27 | Stop reason: HOSPADM

## 2025-06-25 RX ADMIN — SODIUM CHLORIDE, PRESERVATIVE FREE 10 ML: 5 INJECTION INTRAVENOUS at 21:38

## 2025-06-25 RX ADMIN — MORPHINE SULFATE 0.2 MG: 0.5 INJECTION, SOLUTION EPIDURAL; INTRATHECAL; INTRAVENOUS at 10:34

## 2025-06-25 RX ADMIN — KETOROLAC TROMETHAMINE 30 MG: 30 INJECTION, SOLUTION INTRAMUSCULAR; INTRAVENOUS at 11:41

## 2025-06-25 RX ADMIN — ONDANSETRON 4 MG: 4 TABLET, ORALLY DISINTEGRATING ORAL at 17:44

## 2025-06-25 RX ADMIN — EPHEDRINE SULFATE 50 MG: 5 INJECTION INTRAVENOUS at 11:46

## 2025-06-25 RX ADMIN — ONDANSETRON 4 MG: 2 INJECTION, SOLUTION INTRAMUSCULAR; INTRAVENOUS at 11:09

## 2025-06-25 RX ADMIN — DOCUSATE SODIUM 100 MG: 100 CAPSULE, LIQUID FILLED ORAL at 13:59

## 2025-06-25 RX ADMIN — BUPIVACAINE HYDROCHLORIDE 12 MG: 7.5 INJECTION, SOLUTION EPIDURAL; RETROBULBAR at 10:34

## 2025-06-25 RX ADMIN — PHENYLEPHRINE HYDROCHLORIDE 35 MCG/MIN: 10 INJECTION INTRAVENOUS at 10:38

## 2025-06-25 RX ADMIN — Medication 909 ML/HR: at 11:01

## 2025-06-25 RX ADMIN — ACETAMINOPHEN 1000 MG: 500 TABLET ORAL at 22:38

## 2025-06-25 RX ADMIN — SODIUM CHLORIDE, POTASSIUM CHLORIDE, SODIUM LACTATE AND CALCIUM CHLORIDE 1000 ML: 600; 310; 30; 20 INJECTION, SOLUTION INTRAVENOUS at 09:00

## 2025-06-25 RX ADMIN — KETOROLAC TROMETHAMINE 30 MG: 30 INJECTION, SOLUTION INTRAMUSCULAR at 16:56

## 2025-06-25 RX ADMIN — DOCUSATE SODIUM 100 MG: 100 CAPSULE, LIQUID FILLED ORAL at 22:38

## 2025-06-25 RX ADMIN — Medication 15 MG: at 11:51

## 2025-06-25 RX ADMIN — ACETAMINOPHEN 1000 MG: 500 TABLET ORAL at 13:59

## 2025-06-25 RX ADMIN — SODIUM CHLORIDE, POTASSIUM CHLORIDE, SODIUM LACTATE AND CALCIUM CHLORIDE: 600; 310; 30; 20 INJECTION, SOLUTION INTRAVENOUS at 10:32

## 2025-06-25 RX ADMIN — WATER 2000 MG: 1 INJECTION INTRAMUSCULAR; INTRAVENOUS; SUBCUTANEOUS at 10:28

## 2025-06-25 ASSESSMENT — PAIN DESCRIPTION - LOCATION
LOCATION: ABDOMEN
LOCATION: ABDOMEN;INCISION

## 2025-06-25 ASSESSMENT — PAIN SCALES - GENERAL
PAINLEVEL_OUTOF10: 0
PAINLEVEL_OUTOF10: 0

## 2025-06-25 ASSESSMENT — PAIN DESCRIPTION - DESCRIPTORS: DESCRIPTORS: SORE

## 2025-06-25 ASSESSMENT — PAIN DESCRIPTION - ORIENTATION: ORIENTATION: LOWER

## 2025-06-25 NOTE — L&D DELIVERY NOTE
Circumcision:   NA-female  Additional Delivery Comments - RLTCS, lysis of adhesions (anterior abd wall to anterior uterus), bilateral risk-reducing salpingectomies.     JOHN Benites [[id number]]      Labor Events      Cervical Ripening Date/Time:        Rupture Date/Time:             Anesthesia    Method: Spinal       Labor Length    3rd stage: 0h 01m       Delivery Details      Delivery Date: 25 Delivery Time: 10:59:00   Delivery Type: , Low Transverse  Trial of Labor?: No   Categorization: Repeat   Priority: scheduled  Indications for : Prior Uterine Surgery       Skin Incision Type: Low Transverse  Uterine Incision: Low Transverse        Presentation    Presentation: Vertex  Position: Middle  _: Occiput       Assisted Delivery Details    Forceps Attempted?: No  Vacuum Extractor Attempted?: No                           Cord    Vessels: 3 Vessels  Complications: Nuchal Loose  Cord Around: Right Upper Extremity  Delayed Cord Clamping?: Yes  Cord Clamped Date/Time: 2025 11:00:00  Cord Blood Disposition: Discard  Gases Sent?: No              Placenta    Date/Time: 2025 11:00:00  Removal: Manual Removal  Appearance: Intact  Disposition: Discarded       Lacerations           Blood Loss  Mother: Mary Benites #[id number]     Start of Mother's Information      Delivery Blood Loss   Intrapartum & Postpartum: 25 1034 - 25 1302    Delivery Admission: 25 0803 - 25 1302         Intrapartum & Postpartum Delivery Admission    None                  End of Mother's Information  Mother: Mary Benites #[id number]                Delivery Providers    Delivering clinician: Melissa Pichardo MD     Provider Role    Melissa Pichardo MD Obstetrician    Zoila Bean RN Primary Nurse    Nhung Sanchez RN Primary [place] Nurse    Harlan Cook MD Anesthesiologist    Candace Richardson, APRN - CRNA Nurse Anesthetist

## 2025-06-25 NOTE — H&P
History & Physical    Name: Mary Benites MRN: [id number]  SSN: [id number]    YOB: 1985  Age: 39 y.o.  Sex: female      Subjective:     Chief Complaint:  Pregnancy and previous csection, family history of cancer    Estimated Date of Delivery: 25  OB History    Para Term  AB Living   2 1 1   1   SAB IAB Ectopic Molar Multiple Live Births              # Outcome Date GA Lbr J Carlos/2nd Weight Sex Type Anes PTL Lv   2 Current            1 Term 21               Ms. Benites admitted with pregnancy at 39w1d for  section and risk reducing salpingectomies due to previous  section, family history of cancer, and done with childbearing. Ethics gave approval for salpingectomies. Prenatal course was normal. Please see prenatal records which have also been sent to Labor and Delivery and added to Epic for details.    Past Medical History:   Diagnosis Date    Genital herpes     Psychiatric problem     depression     Past Surgical History:   Procedure Laterality Date    EYE SURGERY      OTHER SURGICAL HISTORY      eye surgery    WISDOM TOOTH EXTRACTION       Social History     Occupational History    Not on file   Tobacco Use    Smoking status: Never    Smokeless tobacco: Never   Substance and Sexual Activity    Alcohol use: Not Currently    Drug use: Never    Sexual activity: Yes     History reviewed. No pertinent family history.    Allergies   Allergen Reactions    Penicillins      Other reaction(s): Unknown (comments)  Pt reports childhood,unknown     Prior to Admission medications    Not on File        Review of Systems: A comprehensive review of systems was negative except for that written in the History of Present Illness.    Objective:     Vitals:  Vitals:    25 0821   BP: 116/78   Pulse: 79   Resp: 16   Temp: 98 °F (36.7 °C)   TempSrc: Oral   SpO2: 99%   Weight: 72.1 kg (159 lb)   Height: 1.626 m (5' 4\")         /78   Pulse 79   Temp 98 °F (36.7 °C)

## 2025-06-25 NOTE — CONSULTS
Clinical Ethics Consultation Note    ERD Advisement    Consulted by Dr. Pichardo regarding this 40yo  woman who is scheduled for  section on  and requests cancer risk-reducing opportunistic salpingectomy. Patient has a family hx of breast cancer and procedure is justified to reduce patient's cancer risk.     Shelia Choudhary  Ethics Consultant  [phone]

## 2025-06-25 NOTE — ANESTHESIA PRE PROCEDURE
Department of Anesthesiology  Preprocedure Note       Name:  Mary Benites   Age:  39 y.o.  :  1985                                          MRN:  [id number]         Date:  2025      Surgeon: Surgeon(s):  Melissa Pichardo MD    Procedure: Procedure(s):   SECTION    Medications prior to admission:   Prior to Admission medications    Medication Sig Start Date End Date Taking? Authorizing Provider   acetaminophen (TYLENOL) 500 MG tablet Take 2 tablets by mouth every 8 hours as needed 3/11/21   Automatic Reconciliation, Ar   ibuprofen (ADVIL;MOTRIN) 800 MG tablet Take 1 tablet by mouth in the morning and 1 tablet at noon and 1 tablet in the evening. 3/11/21   Automatic Reconciliation, Ar   venlafaxine 150 MG extended release tablet Take 1 tablet by mouth daily    Automatic Reconciliation, Ar       Current medications:    No current facility-administered medications for this encounter.     Current Outpatient Medications   Medication Sig Dispense Refill   • acetaminophen (TYLENOL) 500 MG tablet Take 2 tablets by mouth every 8 hours as needed     • ibuprofen (ADVIL;MOTRIN) 800 MG tablet Take 1 tablet by mouth in the morning and 1 tablet at noon and 1 tablet in the evening.     • venlafaxine 150 MG extended release tablet Take 1 tablet by mouth daily         Allergies:    Allergies   Allergen Reactions   • Penicillins      Other reaction(s): Unknown (comments)  Pt reports childhood,unknown       Problem List:    Patient Active Problem List   Diagnosis Code   • Term pregnancy Z34.90       Past Medical History:        Diagnosis Date   • Genital herpes    • Psychiatric problem     depression       Past Surgical History:        Procedure Laterality Date   • OTHER SURGICAL HISTORY      eye surgery   • WISDOM TOOTH EXTRACTION         Social History:    Social History     Tobacco Use   • Smoking status: Never   • Smokeless tobacco: Never   Substance Use Topics   • Alcohol use: Not Currently

## 2025-06-25 NOTE — ANESTHESIA PROCEDURE NOTES
Spinal Block    End time: 6/25/2025 10:38 AM  Reason for block: primary anesthetic  Staffing  Performed: anesthesiologist   Anesthesiologist: Harlan Cook MD  Performed by: Harlan Cook MD  Authorized by: Harlan Cook MD    Spinal Block  Patient position: sitting  Prep: DuraPrep  Patient monitoring: cardiac monitor, continuous pulse ox, frequent blood pressure checks and oxygen  Approach: midline  Location: L4/L5  Provider prep: mask, sterile gloves and sterile gown  Needle  Needle type: Pencan   Needle gauge: 24 G  Needle length: 4 in  Assessment  Sensory level: T4  Swirl obtained: Yes  CSF: clear  Attempts: 1  Hemodynamics: stable  Preanesthetic Checklist  Completed: patient identified, IV checked, site marked, risks and benefits discussed, surgical/procedural consents, equipment checked, pre-op evaluation, timeout performed, anesthesia consent given, oxygen available, monitors applied/VS acknowledged, fire risk safety assessment completed and verbalized and blood product R/B/A discussed and consented

## 2025-06-25 NOTE — PROGRESS NOTES
Mary Benites is a 39 y.o.  at 39w1d patient of Melissa Pichardo MD who presents to L&D for a scheduled  and bilateral salpingectomy. She reports Positive FM, denies vaginal bleeding, LOF, and contractions. She also denies headaches, scotoma, RUQ pain, and edema.   Urine sample obtained. EFM and toco placed for initial assessment.    1034:  Walked back to OR.    1059:  Live baby girl delivered by Dr. Pichardo by [facility].

## 2025-06-25 NOTE — OP NOTE
Operative Note    Name: Mary Benites   Medical Record Number: [id number]   YOB: 1985  Today's Date: 2025      Pre-operative Diagnosis: H/O  section [Z98.891], family history of cancer    Post-operative Diagnosis: same, adhesive disease  Operation:Procedure(s):   SECTION   Low transverse , bilateral risk-reducing salpingectomies     Surgeon(s):  Meek Khanna II, MD Powers, Melissa GAMBINO MD    Anesthesia: Spinal    Prophylactic Antibiotics: Ancef  DVT Prophylaxis: Sequential Compression Devices         Fetal Description: torres     Birth Information:   Information for the patient's :  JOHN Benites [[id number]]   @[id number]@    Umbilical Cord:      Placenta:  manual removal    Specimens: bilateral tubes           Complications:  none    Procedure Detail:      After proper patient identification and consent, the patient was taken to the operating room, where spinal anesthesia was administered and found to be adequate. Garcia catheter had been placed using sterile technique.  The patient was prepped and draped in the normal sterile fashion.The abdomen was entered using the Pfannenstiel technique. The peritoneum was not easily identified as the anterior abdominal wall was adhered to the anterior uterus.  Adhesions were taken down using the bovie until the abdominal cavity and uterus were identified. A bladder blade was placed.   . A low transverse uterine incision was made with the scalpel and extended with blunt finger dissection. Amniotomy was performed and the fluid was medium amount clear.  The baby’s head was then delivered atraumatically. The nose and mouth were suctioned. The cord was clamped and cut and the baby was handed off to Nursing staff in attendance. Placenta was then removed from the uterus. The uterus was curettaged with a moist lap pad and cleared of all clots and debris. The uterus was exteriorized. The uterine

## 2025-06-26 LAB
ERYTHROCYTE [DISTWIDTH] IN BLOOD BY AUTOMATED COUNT: 14.1 % (ref 11.5–14.5)
GLUCOSE BLD STRIP.AUTO-MCNC: 81 MG/DL (ref 65–117)
HCT VFR BLD AUTO: 32.3 % (ref 35–47)
HGB BLD-MCNC: 10.5 G/DL (ref 11.5–16)
MCH RBC QN AUTO: 29.7 PG (ref 26–34)
MCHC RBC AUTO-ENTMCNC: 32.5 G/DL (ref 30–36.5)
MCV RBC AUTO: 91.5 FL (ref 80–99)
NRBC # BLD: 0 K/UL (ref 0–0.01)
NRBC BLD-RTO: 0 PER 100 WBC
PLATELET # BLD AUTO: 163 K/UL (ref 150–400)
PMV BLD AUTO: 10.1 FL (ref 8.9–12.9)
RBC # BLD AUTO: 3.53 M/UL (ref 3.8–5.2)
SERVICE CMNT-IMP: NORMAL
WBC # BLD AUTO: 12.9 K/UL (ref 3.6–11)

## 2025-06-26 PROCEDURE — 6370000000 HC RX 637 (ALT 250 FOR IP): Performed by: OBSTETRICS & GYNECOLOGY

## 2025-06-26 PROCEDURE — 6360000002 HC RX W HCPCS: Performed by: OBSTETRICS & GYNECOLOGY

## 2025-06-26 PROCEDURE — 82962 GLUCOSE BLOOD TEST: CPT

## 2025-06-26 PROCEDURE — 1120000000 HC RM PRIVATE OB

## 2025-06-26 PROCEDURE — 85027 COMPLETE CBC AUTOMATED: CPT

## 2025-06-26 PROCEDURE — 36415 COLL VENOUS BLD VENIPUNCTURE: CPT

## 2025-06-26 PROCEDURE — 2580000003 HC RX 258: Performed by: OBSTETRICS & GYNECOLOGY

## 2025-06-26 RX ADMIN — KETOROLAC TROMETHAMINE 30 MG: 30 INJECTION, SOLUTION INTRAMUSCULAR at 06:33

## 2025-06-26 RX ADMIN — DOCUSATE SODIUM 100 MG: 100 CAPSULE, LIQUID FILLED ORAL at 09:04

## 2025-06-26 RX ADMIN — IBUPROFEN 800 MG: 400 TABLET, FILM COATED ORAL at 13:07

## 2025-06-26 RX ADMIN — ACETAMINOPHEN 1000 MG: 500 TABLET ORAL at 14:22

## 2025-06-26 RX ADMIN — KETOROLAC TROMETHAMINE 30 MG: 30 INJECTION, SOLUTION INTRAMUSCULAR at 00:21

## 2025-06-26 RX ADMIN — DOCUSATE SODIUM 100 MG: 100 CAPSULE, LIQUID FILLED ORAL at 21:13

## 2025-06-26 RX ADMIN — ACETAMINOPHEN 1000 MG: 500 TABLET ORAL at 06:34

## 2025-06-26 RX ADMIN — IBUPROFEN 800 MG: 400 TABLET, FILM COATED ORAL at 21:13

## 2025-06-26 RX ADMIN — SODIUM CHLORIDE, POTASSIUM CHLORIDE, SODIUM LACTATE AND CALCIUM CHLORIDE: 600; 310; 30; 20 INJECTION, SOLUTION INTRAVENOUS at 03:10

## 2025-06-26 ASSESSMENT — PAIN DESCRIPTION - ORIENTATION: ORIENTATION: LOWER;MID

## 2025-06-26 ASSESSMENT — PAIN - FUNCTIONAL ASSESSMENT: PAIN_FUNCTIONAL_ASSESSMENT: ACTIVITIES ARE NOT PREVENTED

## 2025-06-26 ASSESSMENT — PAIN SCALES - GENERAL
PAINLEVEL_OUTOF10: 0
PAINLEVEL_OUTOF10: 0
PAINLEVEL_OUTOF10: 3

## 2025-06-26 ASSESSMENT — PAIN DESCRIPTION - DESCRIPTORS: DESCRIPTORS: SORE

## 2025-06-26 ASSESSMENT — PAIN DESCRIPTION - LOCATION: LOCATION: ABDOMEN

## 2025-06-26 NOTE — LACTATION NOTE
06/26/25 0900   Visit Information   Lactation Consult Visit Type IP Initial Consult   Visit Length 30 minutes   Reason for Visit Education  (Mother pumping for her NICU baby)   Breast Feeding History/Assessment   Left Breast Soft  (Colostrum expressed)   Left Nipple Protrude   Right Nipple Protrude   Right Breast Soft  (Colostrum expressed)   Breastfeeding History No  (Attempted with previous baby, however states difficult delivery and recieved no lactation assistance. Reassurred mother that she will be supported in her breastfeeding efforts.)   Care Plan/Breast Care   Breast Care Using breast pump;Lanolin provided   Lactation Comment Mother pumping for her NICU baby. Observed that she can hand express colostrum. Encouraged continued pumping every 2-3 hours, hand express following and stay hydrated.  Equipment: [employer] PS. Measured for 19-21mm flanges. Educated on proper flange fit.       Plan:    Offerr lots of skin to skin when baby is stable.  Pump breasts for 15 minutes every 2-3 hours.  Label breast milk with name, date and time pumped.  Clean pumping equipment.  Transport to NICU within 2 hours; Keep cold if it is going to be more than 2 hours.  Follow instructions on handout titled Increasing Supply

## 2025-06-26 NOTE — PLAN OF CARE
Rukhsana RN  Verbalizes/displays adequate comfort level or baseline comfort level:   Encourage patient to monitor pain and request assistance   Assess pain using appropriate pain scale   Administer analgesics based on type and severity of pain and evaluate response   Implement non-pharmacological measures as appropriate and evaluate response   Consider cultural and social influences on pain and pain management   Notify Licensed Independent Practitioner if interventions unsuccessful or patient reports new pain     Problem: Safety - Adult  Goal: Free from fall injury  Outcome: Progressing  Flowsheets (Taken 6/26/2025 0905)  Free From Fall Injury:   Instruct family/caregiver on patient safety   Based on caregiver fall risk screen, instruct family/caregiver to ask for assistance with transferring infant if caregiver noted to have fall risk factors

## 2025-06-26 NOTE — PROGRESS NOTES
Duramorph Follow-Up Note    1 Day Post-Op sp Procedure(s):   SECTION  SALPINGECTOMY.    BP (!) 114/54   Pulse 70   Temp 97.8 °F (36.6 °C) (Oral)   Resp 16   Ht 1.626 m (5' 4\")   Wt 72.1 kg (159 lb)   SpO2 100%   Breastfeeding Unknown   BMI 27.29 kg/m² .      Patient is POD-1 S/P intrathecal duramorph.  Pain is well controlled  Patient reports no headache, fever, weakness or numbness.  Epidural/spinal tap site is clean, dry and intact.  No obvious Anesthesia complications noted.    Plan:    Pain management as per primary service.

## 2025-06-26 NOTE — PROGRESS NOTES
Post-Partum Day Number 1 Progress Note    Mary Benites     Assessment: Doing well, post partum day 1 s/p scheduled rLTCS     Plan:  - Continue routine postpartum and perineal care as well as maternal education.  - baby girl in NICU for respiratory distress but overall doing ok  - Plan discharge home tomorrow.    Information for the patient's :  JOHN Benites [[id number]]   , Low Transverse Patient doing well without significant complaint.  Voiding without difficulty, normal lochia.    Vitals:  /62   Pulse 68   Temp 98.2 °F (36.8 °C) (Oral)   Resp 15   Ht 1.626 m (5' 4\")   Wt 72.1 kg (159 lb)   SpO2 96%   Breastfeeding Unknown   BMI 27.29 kg/m²   Temp (24hrs), Av.7 °F (36.5 °C), Min:97.4 °F (36.3 °C), Max:98.2 °F (36.8 °C)        Exam:   Patient without distress.                  Fundus firm, nontender per nursing fundal checks.                Perineum with normal lochia noted per nursing assessment.                Lower extremities are negative for pathological edema.    Labs:     Lab Results   Component Value Date/Time    WBC 12.9 2025 04:55 AM    WBC 9.2 2025 08:36 AM    WBC 15.3 2021 12:13 AM    WBC 17.0 03/10/2021 06:31 AM    WBC 11.7 2021 02:09 PM    HGB 10.5 2025 04:55 AM    HGB 11.8 2025 08:36 AM    HGB 9.3 2021 12:13 AM    HGB 11.3 03/10/2021 06:31 AM    HGB 13.0 2021 02:09 PM    HCT 32.3 2025 04:55 AM    HCT 35.2 2025 08:36 AM    HCT 27.2 2021 12:13 AM    HCT 34.1 03/10/2021 06:31 AM    HCT 38.5 2021 02:09 PM     2025 04:55 AM     2025 08:36 AM     2021 12:13 AM     03/10/2021 06:31 AM     2021 02:09 PM       Recent Results (from the past 24 hours)   CBC    Collection Time: 25  4:55 AM   Result Value Ref Range    WBC 12.9 (H) 3.6 - 11.0 K/uL    RBC 3.53 (L) 3.80 - 5.20 M/uL    Hemoglobin 10.5 (L) 11.5 - 16.0 g/dL

## 2025-06-26 NOTE — ANESTHESIA POSTPROCEDURE EVALUATION
Department of Anesthesiology  Postprocedure Note    Patient: Mary Benites  MRN: [id number]  YOB: 1985  Date of evaluation: 2025    Procedure Summary       Date: 25 Room / Location: [facility] L&D 02 / [facility] L&D OR    Anesthesia Start: 1032 Anesthesia Stop: 1200    Procedures:        SECTION (Abdomen)      SALPINGECTOMY (Bilateral: Abdomen) Diagnosis:       H/O  section      (H/O  section [Z98.891])    Surgeons: Melissa Pichardo MD Responsible Provider: Harlan Cook MD    Anesthesia Type: Spinal ASA Status: 2            Anesthesia Type: Spinal    Amber Phase I:      Abmer Phase II:      Anesthesia Post Evaluation    Patient location during evaluation: PACU  Patient participation: complete - patient participated  Level of consciousness: sleepy but conscious and responsive to verbal stimuli  Pain score: 0  Airway patency: patent  Nausea & Vomiting: no vomiting and no nausea  Cardiovascular status: blood pressure returned to baseline and hemodynamically stable  Respiratory status: acceptable  Hydration status: stable  Multimodal analgesia pain management approach  Pain management: adequate    No notable events documented.

## 2025-06-27 VITALS
RESPIRATION RATE: 16 BRPM | WEIGHT: 159 LBS | DIASTOLIC BLOOD PRESSURE: 70 MMHG | OXYGEN SATURATION: 100 % | HEIGHT: 64 IN | BODY MASS INDEX: 27.14 KG/M2 | SYSTOLIC BLOOD PRESSURE: 108 MMHG | TEMPERATURE: 97.9 F | HEART RATE: 75 BPM

## 2025-06-27 PROCEDURE — 6370000000 HC RX 637 (ALT 250 FOR IP): Performed by: OBSTETRICS & GYNECOLOGY

## 2025-06-27 RX ORDER — ACETAMINOPHEN 500 MG
1000 TABLET ORAL EVERY 8 HOURS SCHEDULED
Qty: 60 TABLET | Refills: 0 | Status: SHIPPED | OUTPATIENT
Start: 2025-06-27

## 2025-06-27 RX ORDER — IBUPROFEN 800 MG/1
800 TABLET, FILM COATED ORAL EVERY 8 HOURS
Qty: 120 TABLET | Refills: 3 | Status: SHIPPED | OUTPATIENT
Start: 2025-06-27

## 2025-06-27 RX ORDER — PSEUDOEPHEDRINE HCL 30 MG
100 TABLET ORAL 2 TIMES DAILY
Qty: 60 CAPSULE | Refills: 0 | Status: SHIPPED | OUTPATIENT
Start: 2025-06-27

## 2025-06-27 RX ADMIN — IBUPROFEN 800 MG: 400 TABLET, FILM COATED ORAL at 07:57

## 2025-06-27 RX ADMIN — ACETAMINOPHEN 1000 MG: 500 TABLET ORAL at 14:12

## 2025-06-27 RX ADMIN — ACETAMINOPHEN 1000 MG: 500 TABLET ORAL at 05:50

## 2025-06-27 RX ADMIN — DOCUSATE SODIUM 100 MG: 100 CAPSULE, LIQUID FILLED ORAL at 07:57

## 2025-06-27 ASSESSMENT — PAIN SCALES - GENERAL
PAINLEVEL_OUTOF10: 1
PAINLEVEL_OUTOF10: 0

## 2025-06-27 ASSESSMENT — PAIN DESCRIPTION - DESCRIPTORS: DESCRIPTORS: SORE

## 2025-06-27 ASSESSMENT — PAIN DESCRIPTION - LOCATION: LOCATION: ABDOMEN

## 2025-06-27 ASSESSMENT — PAIN - FUNCTIONAL ASSESSMENT: PAIN_FUNCTIONAL_ASSESSMENT: ACTIVITIES ARE NOT PREVENTED

## 2025-06-27 ASSESSMENT — PAIN DESCRIPTION - ORIENTATION: ORIENTATION: LOWER

## 2025-06-27 NOTE — PROGRESS NOTES
1515: Discharge teaching given to patient. Patient acknowledged understanding of instructions, when to call provider or 911, when to go to follow up, and how to care for self at home. Patient questions answered and is comfortable going home.

## 2025-06-27 NOTE — DISCHARGE SUMMARY
Obstetrical Discharge Summary     Name: Mary Benites MRN: [id number]  SSN: [id number]    YOB: 1985  Age: 39 y.o.  Sex: female      Admit Date: 2025    Discharge Date: 2025     Admitting Physician: Melissa Pichardo MD     Attending Physician:  Melissa Pichardo MD     Admission Diagnoses: H/O  section [Z98.891]  Previous  delivery affecting pregnancy [O34.219]    Discharge Diagnoses:   Information for the patient's :  JOHN Benites [[id number]]   @[id number]@     Additional Diagnoses:  No components found for: \"OBEXTABORH\", \"OBEXTABSCRN\", \"OBEXTRUBELLA\", \"OBEXTGRBS\"    Hospital Course: Normal hospital course following the delivery.    Patient Instructions:   Current Discharge Medication List        CONTINUE these medications which have NOT CHANGED    Details   desvenlafaxine succinate (PRISTIQ) 50 MG TB24 extended release tablet Take 1 tablet by mouth daily      valACYclovir (VALTREX) 500 MG tablet Take 1 tablet by mouth 2 times daily      Prenatal Multivit-Min-Fe-FA (PRENATAL 1 + IRON PO) Take by mouth           STOP taking these medications       acetaminophen (TYLENOL) 500 MG tablet Comments:   Reason for Stopping:               Disposition at Discharge: Home or self care    Condition at Discharge: Stable    Reference my discharge instructions.    Follow up in 6 weeks.     Signed By:  Zoraida Subramanian DO     2025

## 2025-06-27 NOTE — DISCHARGE INSTRUCTIONS
POST DELIVERY DISCHARGE INSTRUCTIONS    Name: Mary Benites  YOB: 1985  Primary Diagnosis: [unfilled]    General:     Diet/Diet Restrictions:  Eight 8-ounce glasses of fluid daily (water, juices); avoid excessive caffeine intake.  Meals/snacks as desired which are high in fiber and carbohydrates and low in fat and cholesterol.    Medications:   {Medication reconciliation information is now added to the patient's AVS automatically when it is printed.  There is no need to use this SmartLink in discharge instructions.  Highlight this text and delete it to clear this message}      Physical Activity / Restrictions / Safety:     Avoid heavy lifting, no more that 8 lbs. For 2-3 weeks;   Limit use of stairs to 2 times daily for the first week home.   No driving for one week.  Avoid intercourse 4-6 weeks, no douching or tampon use.   Check with obstetrician before starting or resuming an exercise program.      Discharge Instructions/Special Treatment/Home Care Needs:     Continue prenatal vitamins.  Continue to use squirt bottle with warm water on your episiotomy after each bathroom use until bleeding stops.  If steri-strips applied to your incision, remove in 7-10 days.    Call your doctor for the following:     Fever over 101 degrees by mouth.  Vaginal bleeding heavier than a normal menstrual period or clots larger than a golf ball.  Red streaks or increased swelling of legs, painful red streaks on your breast.  Painful urination, constipation and increased pain or swelling or discharge with your incision.  If you feel extremely anxious or overwhelmed.  If you have thoughts of harming yourself and/or your baby.    Pain Management:     Take Acetaminophen (Tylenol) or Ibuprofen (Advil, Motrin), as directed for pain.   Use a warm Sitz bath 3 times daily to relieve episiotomy or hemorrhoidal discomfort.   For hemorrhoidal discomfort, use Tucks and Anusol cream as needed and directed.  Heating pad to

## 2025-06-27 NOTE — LACTATION NOTE
This note was copied from a baby's chart.     25 1230   Visit Information   Lactation Consult Visit Type IP Consult Follow Up   Visit Length 30 minutes   Referral Received From Lactation Consultant Follow-up   Reason for Visit Normal  Visit;Latch Problems;Education   Breast Feeding History/Assessment   Left Breast Soft  (Colostrum expressed)   Left Nipple Protrude   Right Nipple Protrude   Right Breast Soft  (Colostrum expressed)   Breastfeeding History No  (Attempted with previous baby, however had a difficult delivery and baby would not latch.)   Feeding Assessment: Infant Factors   Infant Supplementation Formula   Care Plan/Breast Care   Breast Care Using breast pump;Lanolin provided   Lactation Comment Baby is being supplemented with formula. Mother is doing some breastfeeding and some pumping. Reviewed the supply and demand concept of breast milk production and the importance of pumping every 2-3 hours to stimulate and grow her supply. Recommended hydration.  Equipment: [employer]. Measured for 17-19mm flanges. Educated on proper flange fit. Provided with 19mm silicone flange inserts.  [place] oral assessment WDL  Educated to refrain from pacifier use until breastfeeding is well established per AAP.     Goals: 1) Feed baby, 2) Reach full milk supply, 3) Baby transfer well on the breast    Offer lots of skin to skin and access to the breast  Feed baby at early signs of hunger every 2-3 hours  Assure a deep latch, check that baby's lips are turned outward and use breast compression to keep baby actively feeding  Offer a supplement of pumped breast milk via paced bottle feeding  Pump breasts for 15 minutes  Monitor wet and dirty diapers for signs of adequate intake

## (undated) DEVICE — REM POLYHESIVE ADULT PATIENT RETURN ELECTRODE: Brand: VALLEYLAB

## (undated) DEVICE — STRAP,POSITIONING,KNEE/BODY,FOAM,4X60": Brand: MEDLINE

## (undated) DEVICE — C-SECTION MRMC: Brand: MEDLINE INDUSTRIES, INC.

## (undated) DEVICE — ELECTRODE PT RET AD L9FT HI MOIST COND ADH HYDRGEL CORDED

## (undated) DEVICE — CANISTER, RIGID, 3000CC: Brand: MEDLINE INDUSTRIES, INC.

## (undated) DEVICE — 1LYRTR 16FR10ML 100%SILI SNAP: Brand: MEDLINE INDUSTRIES, INC.

## (undated) DEVICE — MEDI-VAC NON-CONDUCTIVE SUCTION TUBING: Brand: CARDINAL HEALTH

## (undated) DEVICE — STAPLER SKIN SQ 30 ABSRB STPL DISP INSORB ORDER VIA PHONE OR EMAIL

## (undated) DEVICE — PENCIL ES L3M BTTN SWCH S STL HEX LOK BLDE ELECTRD HOLSTER

## (undated) DEVICE — SUTURE VCRL SZ 1 L27IN ABSRB VLT L36MM CT-1 1/2 CIR J341H

## (undated) DEVICE — SOLUTION IV 1000ML 0.9% SOD CHL

## (undated) DEVICE — SPONGE COUNTING BAG: Brand: DEVON

## (undated) DEVICE — SUTURE PLN GUT SZ 3-0 L27IN ABSRB YELLOWISH TAN L70MM XLH 52T

## (undated) DEVICE — STERILE LATEX POWDER-FREE SURGICAL GLOVESWITH NITRILE COATING: Brand: PROTEXIS

## (undated) DEVICE — SUTURE MONOCRYL SZ 3-0 L27IN ABSRB UD L19MM PS-2 3/8 CIR PRIM Y427H

## (undated) DEVICE — SOLIDIFIER FLD 2OZ 1500CC N DISINF IN BTL DISP SAFESORB

## (undated) DEVICE — PAD,ABDOMINAL,5"X9",ST,LF,25/BX: Brand: MEDLINE INDUSTRIES, INC.

## (undated) DEVICE — SOLUTION IRRIG 1000ML 0.9% SOD CHL USP POUR PLAS BTL

## (undated) DEVICE — C-SECTION II-LF: Brand: MEDLINE INDUSTRIES, INC.

## (undated) DEVICE — Z DISCONTINUED NO SUB IDED SPONGE LAPAROTOMY W18XL18IN WHITE STRUNG RADIOPAQUE STERILE

## (undated) DEVICE — SUTURE VCRL SZ 0 L36IN ABSRB VLT L40MM CT 1/2 CIR J358H

## (undated) DEVICE — SUTURE MONOCRYL SZ 0 L36IN ABSRB VLT L48MM CTX 1/2 CIR Y398H

## (undated) DEVICE — LARGE, DISPOSABLE ALEXIS O C-SECTION PROTECTOR - RETRACTOR: Brand: ALEXIS ® O C-SECTION PROTECTOR - RETRACTOR

## (undated) DEVICE — SUTURE STRATAFIX SYMMETRIC SZ 1 L18IN ABSRB VLT CT1 L36CM SXPP1A404

## (undated) DEVICE — SOLIDIFIER FLD 3.2OZ 3000CC TRAD IN BTL LIQUI-LOC

## (undated) DEVICE — SUTURE MONOCRYL SZ 0 L36IN ABSRB UD L36MM CT-1 1/2 CIR Y946H

## (undated) DEVICE — SUTURE MCRYL SZ 3-0 L27IN ABSRB UD L19MM PS-2 3/8 CIR PRIM Y427H

## (undated) DEVICE — SEALER ENDOSCP NANO COAT OPN DIV CRV L JAW LIGASURE IMPACT

## (undated) DEVICE — APPLICATOR MEDICATED 26 CC SOLUTION HI LT ORNG CHLORAPREP

## (undated) DEVICE — 3000CC GUARDIAN II: Brand: GUARDIAN

## (undated) DEVICE — CLEANER ES TIP W2XL2IN ADH BK RADPQ FOR S STL ELECTRD

## (undated) DEVICE — PREP SKN CHLRAPRP APL 26ML STR --

## (undated) DEVICE — TUBING, SUCTION, 1/4" X 10', STRAIGHT: Brand: MEDLINE